# Patient Record
Sex: MALE | URBAN - METROPOLITAN AREA
[De-identification: names, ages, dates, MRNs, and addresses within clinical notes are randomized per-mention and may not be internally consistent; named-entity substitution may affect disease eponyms.]

---

## 2019-05-23 ENCOUNTER — APPOINTMENT (OUTPATIENT)
Age: 79
Setting detail: DERMATOLOGY
End: 2019-05-29

## 2019-05-23 VITALS — RESPIRATION RATE: 16 BRPM | HEIGHT: 68 IN | WEIGHT: 155 LBS

## 2019-05-23 DIAGNOSIS — L81.0 POSTINFLAMMATORY HYPERPIGMENTATION: ICD-10-CM

## 2019-05-23 DIAGNOSIS — L57.0 ACTINIC KERATOSIS: ICD-10-CM

## 2019-05-23 DIAGNOSIS — L82.1 OTHER SEBORRHEIC KERATOSIS: ICD-10-CM

## 2019-05-23 DIAGNOSIS — L82.0 INFLAMED SEBORRHEIC KERATOSIS: ICD-10-CM

## 2019-05-23 PROCEDURE — 17000 DESTRUCT PREMALG LESION: CPT | Mod: 59

## 2019-05-23 PROCEDURE — OTHER ADDITIONAL NOTES: OTHER

## 2019-05-23 PROCEDURE — 17003 DESTRUCT PREMALG LES 2-14: CPT | Mod: 59

## 2019-05-23 PROCEDURE — OTHER BENIGN DESTRUCTION: OTHER

## 2019-05-23 PROCEDURE — OTHER COUNSELING: OTHER

## 2019-05-23 PROCEDURE — OTHER LIQUID NITROGEN: OTHER

## 2019-05-23 PROCEDURE — OTHER REASSURANCE: OTHER

## 2019-05-23 PROCEDURE — 17110 DESTRUCT B9 LESION 1-14: CPT

## 2019-05-23 PROCEDURE — 99213 OFFICE O/P EST LOW 20 MIN: CPT | Mod: 25

## 2019-05-23 ASSESSMENT — LOCATION SIMPLE DESCRIPTION DERM
LOCATION SIMPLE: LEFT LOWER BACK
LOCATION SIMPLE: LEFT UPPER BACK
LOCATION SIMPLE: LEFT OCCIPITAL SCALP
LOCATION SIMPLE: RIGHT UPPER BACK
LOCATION SIMPLE: RIGHT FOREHEAD
LOCATION SIMPLE: RIGHT SCALP
LOCATION SIMPLE: POSTERIOR SCALP
LOCATION SIMPLE: LEFT FOREHEAD
LOCATION SIMPLE: RIGHT UPPER BACK
LOCATION SIMPLE: LEFT FOREHEAD
LOCATION SIMPLE: RIGHT EYEBROW

## 2019-05-23 ASSESSMENT — LOCATION DETAILED DESCRIPTION DERM
LOCATION DETAILED: LEFT SUPERIOR FOREHEAD
LOCATION DETAILED: LEFT MID-UPPER BACK
LOCATION DETAILED: LEFT MEDIAL FOREHEAD
LOCATION DETAILED: LEFT SUPERIOR MEDIAL FOREHEAD
LOCATION DETAILED: RIGHT SUPERIOR UPPER BACK
LOCATION DETAILED: RIGHT CENTRAL FRONTAL SCALP
LOCATION DETAILED: RIGHT CENTRAL EYEBROW
LOCATION DETAILED: LEFT SUPERIOR MEDIAL MIDBACK
LOCATION DETAILED: RIGHT INFERIOR MEDIAL UPPER BACK
LOCATION DETAILED: RIGHT MEDIAL FRONTAL SCALP
LOCATION DETAILED: POSTERIOR MID-PARIETAL SCALP
LOCATION DETAILED: LEFT LATERAL FOREHEAD
LOCATION DETAILED: RIGHT LATERAL FRONTAL SCALP
LOCATION DETAILED: LEFT SUPERIOR FOREHEAD
LOCATION DETAILED: LEFT LATERAL FOREHEAD
LOCATION DETAILED: RIGHT SUPERIOR UPPER BACK
LOCATION DETAILED: RIGHT SUPERIOR FOREHEAD
LOCATION DETAILED: LEFT SUPERIOR OCCIPITAL SCALP

## 2019-05-23 ASSESSMENT — LOCATION ZONE DERM
LOCATION ZONE: TRUNK
LOCATION ZONE: TRUNK
LOCATION ZONE: FACE
LOCATION ZONE: FACE
LOCATION ZONE: SCALP

## 2019-05-23 NOTE — PROCEDURE: BENIGN DESTRUCTION
Add 52 Modifier (Optional): no
Medical Necessity Information: It is in your best interest to select a reason for this procedure from the list below. All of these items fulfill various CMS LCD requirements except the new and changing color options.
Detail Level: Detailed
Medical Necessity Clause: This procedure was medically necessary because the lesions that were treated were:
Consent: The patient's consent was obtained including but not limited to risks of crusting, scabbing, blistering, scarring, darker or lighter pigmentary change, recurrence, incomplete removal and infection.
Treatment Number (Will Not Render If 0): 0
Post-Care Instructions: I reviewed with the patient in detail post-care instructions. Patient is to wear sunprotection, and avoid picking at any of the treated lesions. Pt may apply Vaseline to crusted or scabbing areas.
Anesthesia Volume In Cc: 0.5

## 2019-05-23 NOTE — PROCEDURE: ADDITIONAL NOTES
Additional Notes: If the spot on the frontal scalp is not resolved in the 2-3 months will do a biopy.
Additional Notes: Recommend in the fall to do the 5 FU cream twice daily x 30 days.
Detail Level: Simple

## 2019-09-13 ENCOUNTER — APPOINTMENT (OUTPATIENT)
Age: 79
Setting detail: DERMATOLOGY
End: 2019-09-18

## 2019-09-13 VITALS — RESPIRATION RATE: 14 BRPM | WEIGHT: 155 LBS | HEIGHT: 68 IN

## 2019-09-13 DIAGNOSIS — L82.1 OTHER SEBORRHEIC KERATOSIS: ICD-10-CM

## 2019-09-13 DIAGNOSIS — L57.0 ACTINIC KERATOSIS: ICD-10-CM

## 2019-09-13 PROCEDURE — 99213 OFFICE O/P EST LOW 20 MIN: CPT | Mod: 25

## 2019-09-13 PROCEDURE — 17003 DESTRUCT PREMALG LES 2-14: CPT

## 2019-09-13 PROCEDURE — 17000 DESTRUCT PREMALG LESION: CPT

## 2019-09-13 PROCEDURE — OTHER LIQUID NITROGEN: OTHER

## 2019-09-13 PROCEDURE — OTHER PRESCRIPTION: OTHER

## 2019-09-13 PROCEDURE — OTHER COUNSELING: OTHER

## 2019-09-13 RX ORDER — IMIQUIMOD 50 MG/G
CREAM TOPICAL DAILY
Qty: 1 | Refills: 0 | COMMUNITY
Start: 2019-09-13

## 2019-09-13 ASSESSMENT — LOCATION ZONE DERM
LOCATION ZONE: FACE
LOCATION ZONE: SCALP

## 2019-09-13 ASSESSMENT — LOCATION DETAILED DESCRIPTION DERM
LOCATION DETAILED: RIGHT INFERIOR CENTRAL MALAR CHEEK
LOCATION DETAILED: LEFT LATERAL MANDIBULAR CHEEK
LOCATION DETAILED: LEFT LATERAL BUCCAL CHEEK
LOCATION DETAILED: MID-FRONTAL SCALP
LOCATION DETAILED: LEFT CENTRAL MALAR CHEEK
LOCATION DETAILED: LEFT MEDIAL FRONTAL SCALP

## 2019-09-13 ASSESSMENT — LOCATION SIMPLE DESCRIPTION DERM
LOCATION SIMPLE: LEFT SCALP
LOCATION SIMPLE: RIGHT CHEEK
LOCATION SIMPLE: LEFT CHEEK
LOCATION SIMPLE: ANTERIOR SCALP

## 2019-09-13 NOTE — PROCEDURE: LIQUID NITROGEN
Render Note In Bullet Format When Appropriate: No
Consent: - Discussed that these are a result of cumulative sun exposure.\\n- Verbal and written consent was obtained, and risks were reviewed prior to procedure today. \\n- Risks discussed include but are not limited to pain, crusting, scabbing, blistering, scarring, temporary or permanent darker or lighter pigmentary change, recurrence, incomplete resolution, and infection.
Detail Level: Detailed
Render Post-Care Instructions In Note?: yes
Post-Care Instructions: Patient was instructed to avoid picking at any of the treated lesions. Should any lesions treated today not be resolved within 4 weeks or if not certain, then return to clinic for re-evaluation.
Duration Of Freeze Thaw-Cycle (Seconds): 0

## 2019-10-30 ENCOUNTER — APPOINTMENT (OUTPATIENT)
Age: 79
Setting detail: DERMATOLOGY
End: 2019-10-31

## 2019-10-30 VITALS — RESPIRATION RATE: 16 BRPM | HEIGHT: 68 IN | WEIGHT: 155 LBS

## 2019-10-30 DIAGNOSIS — L57.0 ACTINIC KERATOSIS: ICD-10-CM

## 2019-10-30 DIAGNOSIS — L82.1 OTHER SEBORRHEIC KERATOSIS: ICD-10-CM

## 2019-10-30 PROBLEM — D48.5 NEOPLASM OF UNCERTAIN BEHAVIOR OF SKIN: Status: ACTIVE | Noted: 2019-10-30

## 2019-10-30 PROCEDURE — 17000 DESTRUCT PREMALG LESION: CPT | Mod: 59

## 2019-10-30 PROCEDURE — OTHER LIQUID NITROGEN: OTHER

## 2019-10-30 PROCEDURE — OTHER COUNSELING: OTHER

## 2019-10-30 PROCEDURE — 11102 TANGNTL BX SKIN SINGLE LES: CPT

## 2019-10-30 PROCEDURE — 17003 DESTRUCT PREMALG LES 2-14: CPT

## 2019-10-30 PROCEDURE — 88305 TISSUE EXAM BY PATHOLOGIST: CPT

## 2019-10-30 PROCEDURE — 99213 OFFICE O/P EST LOW 20 MIN: CPT | Mod: 25

## 2019-10-30 PROCEDURE — OTHER PATHOLOGY BILLING: OTHER

## 2019-10-30 PROCEDURE — OTHER BIOPSY BY SHAVE METHOD: OTHER

## 2019-10-30 ASSESSMENT — LOCATION SIMPLE DESCRIPTION DERM
LOCATION SIMPLE: RIGHT CHEEK
LOCATION SIMPLE: RIGHT OCCIPITAL SCALP
LOCATION SIMPLE: LEFT CHEEK
LOCATION SIMPLE: LEFT OCCIPITAL SCALP
LOCATION SIMPLE: RIGHT SCALP
LOCATION SIMPLE: RIGHT FOREHEAD
LOCATION SIMPLE: LEFT SUPERIOR EYELID

## 2019-10-30 ASSESSMENT — LOCATION DETAILED DESCRIPTION DERM
LOCATION DETAILED: LEFT SUPERIOR OCCIPITAL SCALP
LOCATION DETAILED: RIGHT INFERIOR CENTRAL MALAR CHEEK
LOCATION DETAILED: LEFT LATERAL MANDIBULAR CHEEK
LOCATION DETAILED: LEFT SUPERIOR EYELID
LOCATION DETAILED: RIGHT CENTRAL FRONTAL SCALP
LOCATION DETAILED: RIGHT SUPERIOR FOREHEAD
LOCATION DETAILED: RIGHT SUPERIOR OCCIPITAL SCALP

## 2019-10-30 ASSESSMENT — LOCATION ZONE DERM
LOCATION ZONE: EYELID
LOCATION ZONE: SCALP
LOCATION ZONE: FACE

## 2019-10-30 NOTE — PROCEDURE: LIQUID NITROGEN
Consent: Discussed that these are a result of cumulative sun exposure. Verbal and written consent was obtained, and risks were reviewed prior to procedure today. Risks discussed include but are not limited to pain, crusting, scabbing, blistering, scarring, temporary or permanent darker or lighter pigmentary change, recurrence, incomplete resolution, and infection.
Post-Care Instructions: Patient was instructed to avoid picking at any of the treated lesions. Should any lesions treated today not be resolved within 8 weeks or if not certain, then return to clinic for re-evaluation.
Render Post-Care Instructions In Note?: yes
Detail Level: Detailed
Render Note In Bullet Format When Appropriate: No
Duration Of Freeze Thaw-Cycle (Seconds): 0

## 2019-10-30 NOTE — PROCEDURE: COUNSELING
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
Detail Level: Detailed
Detail Level: Zone

## 2019-10-30 NOTE — PROCEDURE: BIOPSY BY SHAVE METHOD
Destruction After The Procedure: No
Depth Of Biopsy: dermis
Detail Level: Detailed
Type Of Destruction Used: Curettage
Billing Type: Third-Party Bill
Consent: - Verbal and written consent was obtained and risks were reviewed prior to procedure today. \\n- Risks discussed include but are not limited to scarring, infection, bleeding, scabbing, incomplete removal, nerve damage, pain, and allergy to anesthesia.
Wound Care: Petrolatum
X Size Of Lesion In Cm: 0
Was A Bandage Applied: Yes
Dressing: bandage
Biopsy Method: Dermablade
Anesthesia Type: 2% lidocaine with epinephrine
Cryotherapy Text: The wound bed was treated with cryotherapy after the biopsy was performed.
Notification Instructions: - It can take up to 2 weeks to get a biopsy result. \\n- Please refrain from calling to request results until after 2 weeks.
Silver Nitrate Text: The wound bed was treated with silver nitrate after the biopsy was performed.
Anesthesia Volume In Cc (Will Not Render If 0): 0.3
Electrodesiccation Text: The wound bed was treated with electrodesiccation after the biopsy was performed.
Curettage Text: The wound bed was treated with curettage after the biopsy was performed.
Biopsy Type: H and E
Electrodesiccation And Curettage Text: The wound bed was treated with electrodesiccation and curettage after the biopsy was performed.
Hemostasis: Drysol
Post-Care Instructions: WOUND CARE:\\nDo NOT submerge wound in a bath, swimming pool, or hot tub for at least 1 week or for as long as there is an open wound. Gently cleanse the site daily with mild soap and water. Be careful NOT to allow a forceful stream of water to hit the biopsy site. After cleaning/showering, apply a thin layer of petrolatum ointment or Aquaphor in the wound followed by an adhesive bandage. Continue this process daily until healed. \\n\\nBLEEDING:\\nIf you develop persistent bleeding, apply firm and steady pressure over the dressing with gauze for 15 minutes. If bleeding persists, reapply pressure with an ice pack over the gauze for 15 minutes. NEVER APPLY ICE DIRECTLY TO THE SKIN. Do NOT peek under the gauze during these 15 minutes of pressure.  Call our office at [phone] if you cannot get the bleeding to stop. \\n\\nINFECTION:\\nSigns of infection may include increasing rather than decreasing pain (after the first few days), increasing redness/swelling/heat, draining pus, pink/red streaks around the wound, and fever or chills.  Please call our office immediately at [phone] if infection is suspected. It is normal to have some mild redness on or around the wound edges; this will lighten day by day and will become less tender.\\n\\nPAIN:\\nPain is usually minimal, but if needed you may take acetaminophen (Tylenol) every four hours as needed. Applying an ice pack over the dressing for 15-20 minutes every 2-3 hours will relieve swelling, lessen pain, and help minimize bruising. NEVER APPLY ICE DIRECTLY TO THE SKIN. Avoid ibuprofen (Advil, Motrin) and naproxen (Aleve) for the first 48 hours as these can increase bleeding.\\n\\nSWELLIG AND BRUISING:\\nSwelling and bruising are common and temporary, usually lasting 1 - 2 weeks. It is more common in areas treated around the eyes, hands, and feet. In the days following the procedure, swelling and bruising can be minimized by keeping the affected areas elevated when possible, reducing salty foods, and applying ice packs over the dressing for 15-20 minutes every 2-3 hours. NEVER APPLY ICE DIRECTLY TO THE SKIN.\\n\\nITCHING:\\nItchiness on and around the wound is very common and can last several days to weeks after surgery. Mild itch is normal as the wound is healing. \\n\\nNERVE CHANGES:\\nNumbness is usually temporary, but it may last for several weeks to months. You may also experience sharp pains at the wound sight as it heals.  Mild pain is normal and will gradually improve with time.\\n \\nNO SMOKING:\\nSmoking will delay the healing process. If you smoke, we recommend trying to quit or at minimum reduce how much you smoke following a procedure.

## 2019-10-30 NOTE — PROCEDURE: PATHOLOGY BILLING
Immunohistochemistry ([id number] and [id number]) billing is not performed here. Please use the Immunohistochemistry Stain Billing plan to accomplish this. Immunohistochemistry ([id number] and [id number]) billing is not performed here. Please use the Immunohistochemistry Stain Billing plan to accomplish this.

## 2020-02-05 ENCOUNTER — APPOINTMENT (OUTPATIENT)
Age: 80
Setting detail: DERMATOLOGY
End: 2020-02-12

## 2020-02-05 VITALS — RESPIRATION RATE: 15 BRPM | HEIGHT: 60 IN | WEIGHT: 155 LBS

## 2020-02-05 DIAGNOSIS — L57.0 ACTINIC KERATOSIS: ICD-10-CM

## 2020-02-05 PROCEDURE — OTHER COUNSELING: OTHER

## 2020-02-05 PROCEDURE — 17000 DESTRUCT PREMALG LESION: CPT

## 2020-02-05 PROCEDURE — OTHER ADDITIONAL NOTES: OTHER

## 2020-02-05 PROCEDURE — OTHER LIQUID NITROGEN: OTHER

## 2020-02-05 PROCEDURE — 99213 OFFICE O/P EST LOW 20 MIN: CPT | Mod: 25

## 2020-02-05 PROCEDURE — 17003 DESTRUCT PREMALG LES 2-14: CPT

## 2020-02-05 ASSESSMENT — LOCATION DETAILED DESCRIPTION DERM
LOCATION DETAILED: LEFT CENTRAL ZYGOMA
LOCATION DETAILED: RIGHT MEDIAL MALAR CHEEK
LOCATION DETAILED: LEFT SUPERIOR CENTRAL MALAR CHEEK
LOCATION DETAILED: RIGHT POSTERIOR EAR
LOCATION DETAILED: LEFT INFERIOR CENTRAL MALAR CHEEK
LOCATION DETAILED: RIGHT INFERIOR MEDIAL BUCCAL CHEEK
LOCATION DETAILED: POSTERIOR MID-PARIETAL SCALP
LOCATION DETAILED: RIGHT RADIAL DORSAL HAND
LOCATION DETAILED: RIGHT SUPERIOR PARIETAL SCALP
LOCATION DETAILED: LEFT SUPERIOR PARIETAL SCALP
LOCATION DETAILED: LEFT INFERIOR POSTAURICULAR SKIN

## 2020-02-05 ASSESSMENT — LOCATION ZONE DERM
LOCATION ZONE: HAND
LOCATION ZONE: EAR
LOCATION ZONE: FACE
LOCATION ZONE: SCALP

## 2020-02-05 ASSESSMENT — LOCATION SIMPLE DESCRIPTION DERM
LOCATION SIMPLE: RIGHT CHEEK
LOCATION SIMPLE: LEFT ZYGOMA
LOCATION SIMPLE: POSTERIOR SCALP
LOCATION SIMPLE: SCALP
LOCATION SIMPLE: LEFT CHEEK
LOCATION SIMPLE: RIGHT EAR
LOCATION SIMPLE: RIGHT HAND

## 2020-04-02 ENCOUNTER — APPOINTMENT (OUTPATIENT)
Age: 80
Setting detail: DERMATOLOGY
End: 2020-04-02

## 2020-04-02 VITALS — HEIGHT: 68 IN | RESPIRATION RATE: 16 BRPM | WEIGHT: 155 LBS

## 2020-04-02 DIAGNOSIS — D485 NEOPLASM OF UNCERTAIN BEHAVIOR OF SKIN: ICD-10-CM

## 2020-04-02 DIAGNOSIS — L57.0 ACTINIC KERATOSIS: ICD-10-CM

## 2020-04-02 PROBLEM — D48.5 NEOPLASM OF UNCERTAIN BEHAVIOR OF SKIN: Status: ACTIVE | Noted: 2020-04-02

## 2020-04-02 PROCEDURE — OTHER PRESCRIPTION: OTHER

## 2020-04-02 PROCEDURE — OTHER COUNSELING: OTHER

## 2020-04-02 PROCEDURE — 88305 TISSUE EXAM BY PATHOLOGIST: CPT

## 2020-04-02 PROCEDURE — 99213 OFFICE O/P EST LOW 20 MIN: CPT | Mod: 25

## 2020-04-02 PROCEDURE — OTHER PATHOLOGY BILLING: OTHER

## 2020-04-02 PROCEDURE — 11102 TANGNTL BX SKIN SINGLE LES: CPT

## 2020-04-02 PROCEDURE — OTHER BIOPSY BY SHAVE METHOD: OTHER

## 2020-04-02 RX ORDER — IMIQUIMOD 12.5 MG/.25G
5% CREAM TOPICAL TIW
Qty: 12 | Refills: 0 | Status: ERX | COMMUNITY
Start: 2020-04-02

## 2020-04-02 ASSESSMENT — LOCATION ZONE DERM
LOCATION ZONE: SCALP
LOCATION ZONE: FACE

## 2020-04-02 ASSESSMENT — LOCATION SIMPLE DESCRIPTION DERM
LOCATION SIMPLE: LEFT CHEEK
LOCATION SIMPLE: LEFT SCALP

## 2020-04-02 ASSESSMENT — LOCATION DETAILED DESCRIPTION DERM
LOCATION DETAILED: LEFT LATERAL MANDIBULAR CHEEK
LOCATION DETAILED: LEFT MEDIAL FRONTAL SCALP

## 2020-04-02 NOTE — PROCEDURE: PATHOLOGY BILLING
Immunohistochemistry ([id number] and [id number]) billing is not performed here. Please use the Immunohistochemistry Stain Billing plan to accomplish this. Immunohistochemistry ([id number] and [id number]) billing is not performed here. Please use the Immunohistochemistry Stain Billing plan to accomplish this.

## 2020-04-02 NOTE — PROCEDURE: BIOPSY BY SHAVE METHOD
Render Post-Care Instructions In Note?: no
Notification Instructions: Patient will be notified of biopsy results. However, patient instructed to call the office if not contacted within 2 weeks.
Anesthesia Volume In Cc (Will Not Render If 0): 0.5
Additional Anesthesia Volume In Cc (Will Not Render If 0): 0
Depth Of Biopsy: dermis
Hemostasis: Drysol
Billing Type: Third-Party Bill
Electrodesiccation Text: The wound bed was treated with electrodesiccation after the biopsy was performed.
Detail Level: Detailed
Dressing: bandage
Curettage Text: The wound bed was treated with curettage after the biopsy was performed.
Validate Note Data (See Information Below): Yes
Electrodesiccation And Curettage Text: The wound bed was treated with electrodesiccation and curettage after the biopsy was performed.
Biopsy Type: H and E
Cryotherapy Text: The wound bed was treated with cryotherapy after the biopsy was performed.
Information: Selecting Yes will display possible errors in your note based on the variables you have selected. This validation is only offered as a suggestion for you. PLEASE NOTE THAT THE VALIDATION TEXT WILL BE REMOVED WHEN YOU FINALIZE YOUR NOTE. IF YOU WANT TO FAX A PRELIMINARY NOTE YOU WILL NEED TO TOGGLE THIS TO 'NO' IF YOU DO NOT WANT IT IN YOUR FAXED NOTE.
Silver Nitrate Text: The wound bed was treated with silver nitrate after the biopsy was performed.
Consent: Written consent was obtained and risks were reviewed including but not limited to scarring, infection, bleeding, scabbing, incomplete removal, nerve damage and allergy to anesthesia.
Biopsy Method: Dermablade
Anesthesia Type: 1% lidocaine with epinephrine
Post-Care Instructions: I reviewed with the patient in detail post-care instructions. Patient is to keep the biopsy site dry overnight, and then apply bacitracin twice daily until healed. Patient may apply hydrogen peroxide soaks to remove any crusting.
Wound Care: Petrolatum
Type Of Destruction Used: Curettage

## 2020-05-14 ENCOUNTER — APPOINTMENT (OUTPATIENT)
Age: 80
Setting detail: DERMATOLOGY
End: 2020-05-20

## 2020-05-14 VITALS — WEIGHT: 155 LBS | RESPIRATION RATE: 16 BRPM | HEIGHT: 68 IN

## 2020-05-14 DIAGNOSIS — D485 NEOPLASM OF UNCERTAIN BEHAVIOR OF SKIN: ICD-10-CM

## 2020-05-14 PROBLEM — D48.5 NEOPLASM OF UNCERTAIN BEHAVIOR OF SKIN: Status: ACTIVE | Noted: 2020-05-14

## 2020-05-14 PROCEDURE — OTHER PATHOLOGY BILLING: OTHER

## 2020-05-14 PROCEDURE — 11102 TANGNTL BX SKIN SINGLE LES: CPT

## 2020-05-14 PROCEDURE — 88305 TISSUE EXAM BY PATHOLOGIST: CPT

## 2020-05-14 PROCEDURE — OTHER BIOPSY BY SHAVE METHOD: OTHER

## 2020-05-14 ASSESSMENT — LOCATION DETAILED DESCRIPTION DERM: LOCATION DETAILED: LEFT SUPERIOR LATERAL NECK

## 2020-05-14 ASSESSMENT — LOCATION ZONE DERM: LOCATION ZONE: NECK

## 2020-05-14 ASSESSMENT — LOCATION SIMPLE DESCRIPTION DERM: LOCATION SIMPLE: NECK

## 2020-05-14 NOTE — PROCEDURE: BIOPSY BY SHAVE METHOD
Path Notes Override (Will Replace All Of The Above Text): [id number] Path Notes Override (Will Replace All Of The Above Text): [id number]

## 2020-05-14 NOTE — PROCEDURE: PATHOLOGY BILLING
Immunohistochemistry ([id number] and [id number]) billing is not performed here. Please use the Immunohistochemistry Stain Billing plan to accomplish this. Immunohistochemistry ([id number] and [id number]) billing is not performed here. Please use the Immunohistochemistry Stain Billing plan to accomplish this.

## 2020-05-27 ENCOUNTER — RX ONLY (RX ONLY)
Age: 80
End: 2020-05-27

## 2020-05-27 RX ORDER — IMIQUIMOD 12.5 MG/.25G
CREAM TOPICAL
Qty: 24 | Refills: 1 | Status: ERX | COMMUNITY
Start: 2020-05-27

## 2020-06-08 ENCOUNTER — APPOINTMENT (OUTPATIENT)
Age: 80
Setting detail: DERMATOLOGY
End: 2020-06-08

## 2020-06-08 DIAGNOSIS — L57.8 OTHER SKIN CHANGES DUE TO CHRONIC EXPOSURE TO NONIONIZING RADIATION: ICD-10-CM

## 2020-06-08 PROBLEM — D04.4 CARCINOMA IN SITU OF SKIN OF SCALP AND NECK: Status: ACTIVE | Noted: 2020-06-08

## 2020-06-08 PROCEDURE — 99213 OFFICE O/P EST LOW 20 MIN: CPT | Mod: 25

## 2020-06-08 PROCEDURE — 13132 CMPLX RPR F/C/C/M/N/AX/G/H/F: CPT

## 2020-06-08 PROCEDURE — 17311 MOHS 1 STAGE H/N/HF/G: CPT

## 2020-06-08 PROCEDURE — OTHER MOHS BY LAYER: OTHER

## 2020-06-08 PROCEDURE — OTHER COUNSELING: OTHER

## 2020-06-08 ASSESSMENT — LOCATION SIMPLE DESCRIPTION DERM: LOCATION SIMPLE: POSTERIOR NECK

## 2020-06-08 ASSESSMENT — LOCATION DETAILED DESCRIPTION DERM: LOCATION DETAILED: RIGHT MEDIAL TRAPEZIAL NECK

## 2020-06-08 ASSESSMENT — LOCATION ZONE DERM: LOCATION ZONE: NECK

## 2020-06-08 NOTE — PROCEDURE: MOHS BY LAYER
Epidermal Sutures: 6-0 Ethilon
Distance Of Undermining In Cm (Required): 1
Suture Removal: 7 days
Nostril Rim Text: The closure involved the nostril rim.
Complex Requirements: Exposure Of Vital Structure?: No
Retention Suture Text: Retention sutures were placed to support the closure and prevent dehiscence.
Stage 1: Anesthesia Volume In Cc: 2.5
Stage 6: Number Of Sections (Blocks) ?: 0
Stage 1: Number Of Sections (Blocks) ?: 2
Simple / Intermediate / Complex Repair - Final Wound Length In Cm: 2.7
Post-Care Instructions: I reviewed with the patient in detail post-care instructions. Patient is not to engage in any heavy lifting, exercise, or swimming for the next 14 days. Should the patient develop any fevers, chills, bleeding, severe pain patient will contact the office immediately.
Stage 1: Depth Of Layer: adipose tissue
Repair Type: Complex Repair
Estimated Blood Loss (Cc): minimal
Complex Requirements: Extensive Undermining Performed?: Yes
Consent: The rationale for Mohs was explained to the patient and consent was obtained. The risks, benefits and alternatives to therapy were discussed in detail. Specifically, the risks of infection, scarring, bleeding, prolonged wound healing, incomplete removal, allergy to anesthesia, nerve injury and recurrence were addressed. Prior to the procedure, the treatment site was clearly identified and confirmed by the patient. All components of Universal Protocol/PAUSE Rule completed.
Debridement Text: The wound edges were debrided prior to proceeding with the closure to facilitate wound healing.
Helical Rim Text: The closure involved the helical rim.
Hemostasis: Electrocautery
Stage 1: Defect Y-Dimension: 1.5
Deep Sutures: 5-0 Vicryl
Epidermal Closure: simple interrupted
Body Location Override (Optional - Billing Will Still Be Based On Selected Body Map Location If Applicable): Left Lateral Superior Neck
Undermining Type: Entire Wound
Anesthesia Type: 1% lidocaine with epinephrine
Wound Care: Petrolatum
Vermilion Border Text: The closure involved the vermilion border.
Detail Level: Detailed

## 2020-06-18 ENCOUNTER — APPOINTMENT (OUTPATIENT)
Age: 80
Setting detail: DERMATOLOGY
End: 2020-06-23

## 2020-06-18 DIAGNOSIS — L57.0 ACTINIC KERATOSIS: ICD-10-CM

## 2020-06-18 DIAGNOSIS — Z48.02 ENCOUNTER FOR REMOVAL OF SUTURES: ICD-10-CM

## 2020-06-18 PROBLEM — D48.5 NEOPLASM OF UNCERTAIN BEHAVIOR OF SKIN: Status: ACTIVE | Noted: 2020-06-18

## 2020-06-18 PROCEDURE — 88305 TISSUE EXAM BY PATHOLOGIST: CPT

## 2020-06-18 PROCEDURE — OTHER SUTURE REMOVAL (GLOBAL PERIOD): OTHER

## 2020-06-18 PROCEDURE — 11102 TANGNTL BX SKIN SINGLE LES: CPT | Mod: 79

## 2020-06-18 PROCEDURE — 99212 OFFICE O/P EST SF 10 MIN: CPT | Mod: 24,25

## 2020-06-18 PROCEDURE — OTHER PATHOLOGY BILLING: OTHER

## 2020-06-18 PROCEDURE — OTHER ADDITIONAL NOTES: OTHER

## 2020-06-18 PROCEDURE — OTHER BIOPSY BY SHAVE METHOD: OTHER

## 2020-06-18 PROCEDURE — OTHER COUNSELING: OTHER

## 2020-06-18 ASSESSMENT — LOCATION SIMPLE DESCRIPTION DERM
LOCATION SIMPLE: LEFT CHEEK
LOCATION SIMPLE: RIGHT SCALP

## 2020-06-18 ASSESSMENT — LOCATION ZONE DERM
LOCATION ZONE: FACE
LOCATION ZONE: SCALP

## 2020-06-18 ASSESSMENT — LOCATION DETAILED DESCRIPTION DERM
LOCATION DETAILED: LEFT LATERAL MANDIBULAR CHEEK
LOCATION DETAILED: RIGHT MEDIAL FRONTAL SCALP

## 2020-06-18 NOTE — PROCEDURE: BIOPSY BY SHAVE METHOD
Wound Care: Petrolatum
Anesthesia Volume In Cc (Will Not Render If 0): 0.5
Post-Care Instructions: WOUND CARE:\\nDo NOT submerge wound in a bath, swimming pool, or hot tub for at least 1 week or for as long as there is an open wound. Gently cleanse the site daily with mild soap and water. Be careful NOT to allow a forceful stream of water to hit the biopsy site. After cleaning/showering, apply a thin layer of petrolatum ointment or Aquaphor in the wound followed by an adhesive bandage. Continue this process daily until healed. \\n\\nBLEEDING:\\nIf you develop persistent bleeding, apply firm and steady pressure over the dressing with gauze for 15 minutes. If bleeding persists, reapply pressure with an ice pack over the gauze for 15 minutes. NEVER APPLY ICE DIRECTLY TO THE SKIN. Do NOT peek under the gauze during these 15 minutes of pressure.  Call our office at [phone] if you cannot get the bleeding to stop. \\n\\nINFECTION:\\nSigns of infection may include increasing rather than decreasing pain (after the first few days), increasing redness/swelling/heat, draining pus, pink/red streaks around the wound, and fever or chills.  Please call our office immediately at [phone] if infection is suspected. It is normal to have some mild redness on or around the wound edges; this will lighten day by day and will become less tender.\\n\\nPAIN:\\nPain is usually minimal, but if needed you may take acetaminophen (Tylenol) every four hours as needed. Applying an ice pack over the dressing for 15-20 minutes every 2-3 hours will relieve swelling, lessen pain, and help minimize bruising. NEVER APPLY ICE DIRECTLY TO THE SKIN. Avoid ibuprofen (Advil, Motrin) and naproxen (Aleve) for the first 48 hours as these can increase bleeding.\\n\\nSWELLIG AND BRUISING:\\nSwelling and bruising are common and temporary, usually lasting 1 - 2 weeks. It is more common in areas treated around the eyes, hands, and feet. In the days following the procedure, swelling and bruising can be minimized by keeping the affected areas elevated when possible, reducing salty foods, and applying ice packs over the dressing for 15-20 minutes every 2-3 hours. NEVER APPLY ICE DIRECTLY TO THE SKIN.\\n\\nITCHING:\\nItchiness on and around the wound is very common and can last several days to weeks after surgery. Mild itch is normal as the wound is healing. \\n\\nNERVE CHANGES:\\nNumbness is usually temporary, but it may last for several weeks to months. You may also experience sharp pains at the wound sight as it heals.  Mild pain is normal and will gradually improve with time.\\n \\nNO SMOKING:\\nSmoking will delay the healing process. If you smoke, we recommend trying to quit or at minimum reduce how much you smoke following a procedure.
Biopsy Type: H and E
X Size Of Lesion In Cm: 0
Type Of Destruction Used: Electrodesiccation
Depth Of Biopsy: dermis
Electrodesiccation Text: The wound bed was treated with electrodesiccation after the biopsy was performed.
Electrodesiccation And Curettage Text: The wound bed was treated with electrodesiccation and curettage after the biopsy was performed.
Notification Instructions: - It can take up to 2 weeks to get a biopsy result. \\n- Please refrain from calling to request results until after 2 weeks.
Hemostasis: Aluminum Chloride
Silver Nitrate Text: The wound bed was treated with silver nitrate after the biopsy was performed.
Dressing: bandage
Biopsy Method: Dermablade
Bill For Surgical Tray: no
Detail Level: Detailed
Curettage Text: The wound bed was treated with curettage after the biopsy was performed.
Billing Type: Third-Party Bill
Anesthesia Type: 2% lidocaine with epinephrine
Consent: - Verbal and written consent was obtained and risks were reviewed prior to procedure today. \\n- Risks discussed include but are not limited to scarring, infection, bleeding, scabbing, incomplete removal, nerve damage, pain, and allergy to anesthesia.
Render In Bullet Format When Appropriate: Yes
Cryotherapy Text: The wound bed was treated with cryotherapy after the biopsy was performed.

## 2020-06-18 NOTE — PROCEDURE: PATHOLOGY BILLING
Immunohistochemistry ([id number] and [id number]) billing is not performed here. Please use the Immunohistochemistry Stain Billing plan to accomplish this. Immunohistochemistry ([id number] and [id number]) billing is not performed here. Please use the Immunohistochemistry Stain Billing plan to accomplish this.

## 2020-06-18 NOTE — PROCEDURE: ADDITIONAL NOTES
Additional Notes: Patient has finished course of imiquimod and c/o dryness and itching on scalp. Nurse applied a thick layer of Aquaphor to scalp and KL recommended to RTC in 1 month to recheck scalp.
Detail Level: Simple

## 2020-06-18 NOTE — PROCEDURE: SUTURE REMOVAL (GLOBAL PERIOD)
Add 37907 Cpt? (Important Note: In 2017 The Use Of 50107 Is Being Tracked By Cms To Determine Future Global Period Reimbursement For Global Periods): no
Detail Level: Detailed

## 2020-06-18 NOTE — PROCEDURE: PATHOLOGY BILLING
vaginal restrictions only for two weeks Rendering Text In Billing: The biopsy specimen was grossed and processed into a slide.

## 2020-06-18 NOTE — PROCEDURE: COUNSELING
Detail Level: Zone
Patient Specific Counseling (Will Not Stick From Patient To Patient): - Advised that this lesion has features that suggest a skin cancer so a biopsy is necessary to confirm the diagnosis and guide treatment.\\n- Patient expressed understanding. \\n- If skin cancer is confirmed, further treatment will be necessary.
Detail Level: Detailed

## 2020-07-01 ENCOUNTER — APPOINTMENT (OUTPATIENT)
Age: 80
Setting detail: DERMATOLOGY
End: 2020-07-02

## 2020-07-01 VITALS — RESPIRATION RATE: 16 BRPM | HEIGHT: 60 IN | WEIGHT: 150 LBS

## 2020-07-01 DIAGNOSIS — L57.0 ACTINIC KERATOSIS: ICD-10-CM

## 2020-07-01 PROCEDURE — 17003 DESTRUCT PREMALG LES 2-14: CPT

## 2020-07-01 PROCEDURE — OTHER COUNSELING: OTHER

## 2020-07-01 PROCEDURE — 17000 DESTRUCT PREMALG LESION: CPT

## 2020-07-01 PROCEDURE — OTHER LIQUID NITROGEN: OTHER

## 2020-07-01 ASSESSMENT — LOCATION DETAILED DESCRIPTION DERM
LOCATION DETAILED: LEFT NASAL DORSUM
LOCATION DETAILED: NASAL DORSUM
LOCATION DETAILED: NASAL SUPRATIP
LOCATION DETAILED: LEFT SUPERIOR PARIETAL SCALP

## 2020-07-01 ASSESSMENT — LOCATION SIMPLE DESCRIPTION DERM
LOCATION SIMPLE: SCALP
LOCATION SIMPLE: NOSE

## 2020-07-01 ASSESSMENT — LOCATION ZONE DERM
LOCATION ZONE: NOSE
LOCATION ZONE: SCALP

## 2020-07-01 NOTE — PROCEDURE: COUNSELING
Detail Level: Detailed
Patient Specific Counseling (Will Not Stick From Patient To Patient): Consider field therapy on nose this fall

## 2020-07-22 ENCOUNTER — APPOINTMENT (OUTPATIENT)
Age: 80
Setting detail: DERMATOLOGY
End: 2020-07-24

## 2020-07-22 VITALS — HEIGHT: 69 IN | WEIGHT: 150 LBS | RESPIRATION RATE: 16 BRPM

## 2020-07-22 DIAGNOSIS — D485 NEOPLASM OF UNCERTAIN BEHAVIOR OF SKIN: ICD-10-CM

## 2020-07-22 DIAGNOSIS — L57.0 ACTINIC KERATOSIS: ICD-10-CM

## 2020-07-22 PROBLEM — D48.5 NEOPLASM OF UNCERTAIN BEHAVIOR OF SKIN: Status: ACTIVE | Noted: 2020-07-22

## 2020-07-22 PROCEDURE — 11102 TANGNTL BX SKIN SINGLE LES: CPT

## 2020-07-22 PROCEDURE — 11103 TANGNTL BX SKIN EA SEP/ADDL: CPT

## 2020-07-22 PROCEDURE — OTHER BIOPSY BY SHAVE METHOD: OTHER

## 2020-07-22 PROCEDURE — OTHER LIQUID NITROGEN: OTHER

## 2020-07-22 PROCEDURE — 17000 DESTRUCT PREMALG LESION: CPT | Mod: 59

## 2020-07-22 PROCEDURE — 17003 DESTRUCT PREMALG LES 2-14: CPT

## 2020-07-22 PROCEDURE — OTHER ADDITIONAL NOTES: OTHER

## 2020-07-22 ASSESSMENT — LOCATION SIMPLE DESCRIPTION DERM
LOCATION SIMPLE: LEFT SCALP
LOCATION SIMPLE: RIGHT SCALP
LOCATION SIMPLE: LEFT FOREHEAD
LOCATION SIMPLE: RIGHT FOREHEAD

## 2020-07-22 ASSESSMENT — LOCATION DETAILED DESCRIPTION DERM
LOCATION DETAILED: RIGHT MEDIAL FRONTAL SCALP
LOCATION DETAILED: LEFT MEDIAL FRONTAL SCALP
LOCATION DETAILED: LEFT CENTRAL FRONTAL SCALP
LOCATION DETAILED: RIGHT SUPERIOR MEDIAL FOREHEAD
LOCATION DETAILED: LEFT SUPERIOR MEDIAL FOREHEAD

## 2020-07-22 ASSESSMENT — LOCATION ZONE DERM
LOCATION ZONE: FACE
LOCATION ZONE: SCALP

## 2020-07-22 NOTE — PROCEDURE: ADDITIONAL NOTES
Detail Level: Simple
Additional Notes: -these spots have been treated with liquid nitrogen multiple times as well as Aldara and still persist.\\n-The base was treated with LN2 after SBx.

## 2020-07-22 NOTE — PROCEDURE: BIOPSY BY SHAVE METHOD
X Size Of Lesion In Cm: 0
Anesthesia Type: 1% lidocaine with epinephrine
Validate That Anesthesia Is Not 0: No
Electrodesiccation And Curettage Text: The wound bed was treated with electrodesiccation and curettage after the biopsy was performed.
Biopsy Method: Dermablade
Electrodesiccation Text: The wound bed was treated with electrodesiccation after the biopsy was performed.
Consent: Written consent was obtained and risks were reviewed including but not limited to scarring, infection, bleeding, scabbing, incomplete removal, nerve damage and allergy to anesthesia.
Post-Care Instructions: I reviewed with the patient in detail post-care instructions. Patient is to keep the biopsy site dry overnight, and then apply bacitracin twice daily until healed. Patient may apply hydrogen peroxide soaks to remove any crusting.
Type Of Destruction Used: Curettage
Wound Care: Petrolatum
Was A Bandage Applied: Yes
Silver Nitrate Text: The wound bed was treated with silver nitrate after the biopsy was performed.
Hemostasis: Drysol
Depth Of Biopsy: dermis
Notification Instructions: Patient will be notified of biopsy results. However, patient instructed to call the office if not contacted within 2 weeks.
Dressing: bandage
Billing Type: Third-Party Bill
Cryotherapy Text: The wound bed was treated with cryotherapy after the biopsy was performed.
Curettage Text: The wound bed was treated with curettage after the biopsy was performed.
Information: Selecting Yes will display possible errors in your note based on the variables you have selected. This validation is only offered as a suggestion for you. PLEASE NOTE THAT THE VALIDATION TEXT WILL BE REMOVED WHEN YOU FINALIZE YOUR NOTE. IF YOU WANT TO FAX A PRELIMINARY NOTE YOU WILL NEED TO TOGGLE THIS TO 'NO' IF YOU DO NOT WANT IT IN YOUR FAXED NOTE.
Anesthesia Volume In Cc (Will Not Render If 0): 0.5
Detail Level: Detailed
Biopsy Type: H and E

## 2020-11-25 ENCOUNTER — APPOINTMENT (OUTPATIENT)
Dept: URBAN - METROPOLITAN AREA CLINIC 254 | Age: 80
Setting detail: DERMATOLOGY
End: 2020-11-30

## 2020-11-25 VITALS — WEIGHT: 145 LBS | HEIGHT: 68 IN | RESPIRATION RATE: 16 BRPM

## 2020-11-25 DIAGNOSIS — D485 NEOPLASM OF UNCERTAIN BEHAVIOR OF SKIN: ICD-10-CM

## 2020-11-25 DIAGNOSIS — L57.0 ACTINIC KERATOSIS: ICD-10-CM

## 2020-11-25 PROBLEM — D48.5 NEOPLASM OF UNCERTAIN BEHAVIOR OF SKIN: Status: ACTIVE | Noted: 2020-11-25

## 2020-11-25 PROCEDURE — OTHER BIOPSY BY SHAVE METHOD: OTHER

## 2020-11-25 PROCEDURE — 11102 TANGNTL BX SKIN SINGLE LES: CPT

## 2020-11-25 PROCEDURE — OTHER PRESCRIPTION: OTHER

## 2020-11-25 PROCEDURE — OTHER COUNSELING: OTHER

## 2020-11-25 PROCEDURE — 99213 OFFICE O/P EST LOW 20 MIN: CPT | Mod: 25

## 2020-11-25 RX ORDER — UREA 40 %
40% CREAM (GRAM) TOPICAL
Qty: 1 | Refills: 2 | Status: ERX | COMMUNITY
Start: 2020-11-25

## 2020-11-25 ASSESSMENT — LOCATION ZONE DERM
LOCATION ZONE: SCALP
LOCATION ZONE: FACE

## 2020-11-25 ASSESSMENT — LOCATION SIMPLE DESCRIPTION DERM
LOCATION SIMPLE: ANTERIOR SCALP
LOCATION SIMPLE: RIGHT CHEEK

## 2020-11-25 ASSESSMENT — LOCATION DETAILED DESCRIPTION DERM
LOCATION DETAILED: MID-FRONTAL SCALP
LOCATION DETAILED: RIGHT SUPERIOR LATERAL BUCCAL CHEEK

## 2020-11-25 NOTE — PROCEDURE: BIOPSY BY SHAVE METHOD
Hide Anticipated Plan (Based On Presumed Biopsy Results)?: No
Billing Type: Third-Party Bill
Anesthesia Type: 1% lidocaine with epinephrine
Silver Nitrate Text: The wound bed was treated with silver nitrate after the biopsy was performed.
Type Of Destruction Used: Curettage
Curettage Text: The wound bed was treated with curettage after the biopsy was performed.
Consent: Written consent was obtained and risks were reviewed including but not limited to scarring, infection, bleeding, scabbing, incomplete removal, nerve damage and allergy to anesthesia.
Information: Selecting Yes will display possible errors in your note based on the variables you have selected. This validation is only offered as a suggestion for you. PLEASE NOTE THAT THE VALIDATION TEXT WILL BE REMOVED WHEN YOU FINALIZE YOUR NOTE. IF YOU WANT TO FAX A PRELIMINARY NOTE YOU WILL NEED TO TOGGLE THIS TO 'NO' IF YOU DO NOT WANT IT IN YOUR FAXED NOTE.
Post-Care Instructions: I reviewed with the patient in detail post-care instructions. Patient is to keep the biopsy site dry overnight, and then apply bacitracin twice daily until healed. Patient may apply hydrogen peroxide soaks to remove any crusting.
Anesthesia Volume In Cc (Will Not Render If 0): 0.5
Electrodesiccation Text: The wound bed was treated with electrodesiccation after the biopsy was performed.
X Size Of Lesion In Cm: 0
Cryotherapy Text: The wound bed was treated with cryotherapy after the biopsy was performed.
Notification Instructions: Patient will be notified of biopsy results. However, patient instructed to call the office if not contacted within 2 weeks.
Hemostasis: Drysol
Wound Care: Petrolatum
Dressing: bandage
Electrodesiccation And Curettage Text: The wound bed was treated with electrodesiccation and curettage after the biopsy was performed.
Was A Bandage Applied: Yes
Detail Level: Detailed
Depth Of Biopsy: dermis
Biopsy Method: Dermablade
Biopsy Type: H and E

## 2021-01-04 ENCOUNTER — APPOINTMENT (OUTPATIENT)
Dept: URBAN - METROPOLITAN AREA CLINIC 259 | Age: 81
Setting detail: DERMATOLOGY
End: 2021-01-04

## 2021-01-04 DIAGNOSIS — L81.4 OTHER MELANIN HYPERPIGMENTATION: ICD-10-CM

## 2021-01-04 DIAGNOSIS — L57.8 OTHER SKIN CHANGES DUE TO CHRONIC EXPOSURE TO NONIONIZING RADIATION: ICD-10-CM

## 2021-01-04 PROBLEM — C44.319 BASAL CELL CARCINOMA OF SKIN OF OTHER PARTS OF FACE: Status: ACTIVE | Noted: 2021-01-04

## 2021-01-04 PROCEDURE — OTHER SUNSCREEN RECOMMENDATIONS: OTHER

## 2021-01-04 PROCEDURE — OTHER MIPS QUALITY: OTHER

## 2021-01-04 PROCEDURE — OTHER MOHS BY LAYER: OTHER

## 2021-01-04 PROCEDURE — 17311 MOHS 1 STAGE H/N/HF/G: CPT

## 2021-01-04 PROCEDURE — 99213 OFFICE O/P EST LOW 20 MIN: CPT | Mod: 25

## 2021-01-04 PROCEDURE — OTHER COUNSELING: OTHER

## 2021-01-04 PROCEDURE — 13132 CMPLX RPR F/C/C/M/N/AX/G/H/F: CPT

## 2021-01-04 ASSESSMENT — LOCATION ZONE DERM
LOCATION ZONE: FACE
LOCATION ZONE: TRUNK

## 2021-01-04 ASSESSMENT — LOCATION DETAILED DESCRIPTION DERM
LOCATION DETAILED: RIGHT INFERIOR MEDIAL UPPER BACK
LOCATION DETAILED: RIGHT CENTRAL MALAR CHEEK

## 2021-01-04 ASSESSMENT — LOCATION SIMPLE DESCRIPTION DERM
LOCATION SIMPLE: RIGHT UPPER BACK
LOCATION SIMPLE: RIGHT CHEEK

## 2021-01-04 NOTE — PROCEDURE: MOHS BY LAYER
Complex Requirements: Extensive Undermining Performed?: Yes
Body Location Override (Optional - Billing Will Still Be Based On Selected Body Map Location If Applicable): Right Lateral Superior Buccal Cheek
Stage 8: Number Of Sections (Blocks) ?: 0
Distance Of Undermining In Cm (Required): 1
Nostril Rim Text: The closure involved the nostril rim.
Complex Requirements: Exposure Of Vital Structure?: No
Retention Suture Text: Retention sutures were placed to support the closure and prevent dehiscence.
Stage 1: Depth Of Layer: adipose tissue
Suture Removal: 7 days
Epidermal Sutures: 6-0 Nylon
Undermining Type: Entire Wound
Deep Sutures: 5-0 Vicryl
Epidermal Closure: simple interrupted
Hemostasis: Electrocautery
Repair Type: Complex Repair
Size Of Lesion: 0.9
Wound Care: Petrolatum
Anesthesia Type: 1% lidocaine with epinephrine
Detail Level: Detailed
Estimated Blood Loss (Cc): minimal
Debridement Text: The wound edges were debrided prior to proceeding with the closure to facilitate wound healing.
Helical Rim Text: The closure involved the helical rim.
Stage 1: Number Of Sections (Blocks) ?: 2
Consent: The rationale for Mohs was explained to the patient and consent was obtained. The risks, benefits and alternatives to therapy were discussed in detail. Specifically, the risks of infection, scarring, bleeding, prolonged wound healing, incomplete removal, allergy to anesthesia, nerve injury and recurrence were addressed. Prior to the procedure, the treatment site was clearly identified and confirmed by the patient. All components of Universal Protocol/PAUSE Rule completed.
Stage 1: Defect X-Dimension: 1.5
Simple / Intermediate / Complex Repair - Final Wound Length In Cm: 2.7
Vermilion Border Text: The closure involved the vermilion border.
Post-Care Instructions: I reviewed with the patient in detail post-care instructions. Patient is not to engage in any heavy lifting, exercise, or swimming for the next 14 days. Should the patient develop any fevers, chills, bleeding, severe pain patient will contact the office immediately.

## 2021-01-11 ENCOUNTER — APPOINTMENT (OUTPATIENT)
Dept: URBAN - METROPOLITAN AREA CLINIC 254 | Age: 81
Setting detail: DERMATOLOGY
End: 2021-01-11

## 2021-01-11 DIAGNOSIS — Z48.02 ENCOUNTER FOR REMOVAL OF SUTURES: ICD-10-CM

## 2021-01-11 PROCEDURE — OTHER COUNSELING: OTHER

## 2021-01-11 PROCEDURE — OTHER SUTURE REMOVAL (GLOBAL PERIOD): OTHER

## 2021-01-11 ASSESSMENT — LOCATION ZONE DERM: LOCATION ZONE: FACE

## 2021-01-11 ASSESSMENT — LOCATION SIMPLE DESCRIPTION DERM: LOCATION SIMPLE: CHIN

## 2021-01-11 ASSESSMENT — LOCATION DETAILED DESCRIPTION DERM: LOCATION DETAILED: RIGHT CHIN

## 2021-01-11 NOTE — PROCEDURE: SUTURE REMOVAL (GLOBAL PERIOD)
Detail Level: Detailed
Body Location Override (Optional - Billing Will Still Be Based On Selected Body Map Location If Applicable): right lateral superior buccal cheek
Add 02152 Cpt? (Important Note: In 2017 The Use Of 60062 Is Being Tracked By Cms To Determine Future Global Period Reimbursement For Global Periods): no

## 2021-02-18 ENCOUNTER — APPOINTMENT (OUTPATIENT)
Dept: URBAN - METROPOLITAN AREA CLINIC 254 | Age: 81
Setting detail: DERMATOLOGY
End: 2021-02-20

## 2021-02-18 VITALS — RESPIRATION RATE: 14 BRPM | HEIGHT: 68 IN | WEIGHT: 143 LBS

## 2021-02-18 DIAGNOSIS — Z71.89 OTHER SPECIFIED COUNSELING: ICD-10-CM

## 2021-02-18 DIAGNOSIS — L57.0 ACTINIC KERATOSIS: ICD-10-CM

## 2021-02-18 PROBLEM — D48.5 NEOPLASM OF UNCERTAIN BEHAVIOR OF SKIN: Status: ACTIVE | Noted: 2021-02-18

## 2021-02-18 PROCEDURE — 11102 TANGNTL BX SKIN SINGLE LES: CPT

## 2021-02-18 PROCEDURE — OTHER ADDITIONAL NOTES: OTHER

## 2021-02-18 PROCEDURE — OTHER BIOPSY BY SHAVE METHOD: OTHER

## 2021-02-18 PROCEDURE — 88305 TISSUE EXAM BY PATHOLOGIST: CPT

## 2021-02-18 PROCEDURE — OTHER PATHOLOGY BILLING: OTHER

## 2021-02-18 PROCEDURE — 99212 OFFICE O/P EST SF 10 MIN: CPT | Mod: 25

## 2021-02-18 PROCEDURE — OTHER COUNSELING: OTHER

## 2021-02-18 ASSESSMENT — LOCATION ZONE DERM
LOCATION ZONE: NOSE
LOCATION ZONE: SCALP
LOCATION ZONE: TRUNK

## 2021-02-18 ASSESSMENT — LOCATION DETAILED DESCRIPTION DERM
LOCATION DETAILED: RIGHT MEDIAL UPPER BACK
LOCATION DETAILED: NASAL SUPRATIP
LOCATION DETAILED: RIGHT SUPERIOR PARIETAL SCALP

## 2021-02-18 ASSESSMENT — LOCATION SIMPLE DESCRIPTION DERM
LOCATION SIMPLE: NOSE
LOCATION SIMPLE: SCALP
LOCATION SIMPLE: RIGHT UPPER BACK

## 2021-02-18 NOTE — PROCEDURE: ADDITIONAL NOTES
Detail Level: Simple
Additional Notes: Recommended FBE as patient hasn’t had one in awhile. Patient will schedule if desired
Render Risk Assessment In Note?: no
Additional Notes: Patient has been applying urea to scalp. KL reassured patient his scalp is improving and to continue urea. Also briefly discussed fluorouracil 5% cream, and that this would be beneficial treating precancerous lesions on his scalp. Will wait to further discuss this pending nose biopsy today

## 2021-02-18 NOTE — PROCEDURE: PATHOLOGY BILLING
Rendering Text In Billing: The slides will be read by [employer] and reported in the attached document. Rendering Text In Billing: The slides will be read by [employer] and reported in the attached document.

## 2021-02-18 NOTE — PROCEDURE: PATHOLOGY BILLING
Immunohistochemistry ([id number] and [id number]) billing is not performed here. Please use the Immunohistochemistry Stain Billing plan to accomplish this. Immunohistochemistry ([id number] and [id number]) billing is not performed here. Please use the Immunohistochemistry Stain Billing plan to accomplish this.

## 2021-03-03 ENCOUNTER — RX ONLY (RX ONLY)
Age: 81
End: 2021-03-03

## 2021-03-03 RX ORDER — IMIQUIMOD 50 MG/G
CREAM TOPICAL
Qty: 8 | Refills: 0 | Status: ERX

## 2021-05-05 ENCOUNTER — APPOINTMENT (OUTPATIENT)
Dept: URBAN - METROPOLITAN AREA CLINIC 254 | Age: 81
Setting detail: DERMATOLOGY
End: 2021-05-06

## 2021-05-05 VITALS — RESPIRATION RATE: 16 BRPM | WEIGHT: 135 LBS | HEIGHT: 68 IN

## 2021-05-05 DIAGNOSIS — L57.0 ACTINIC KERATOSIS: ICD-10-CM

## 2021-05-05 PROBLEM — D04.39 CARCINOMA IN SITU OF SKIN OF OTHER PARTS OF FACE: Status: ACTIVE | Noted: 2021-05-05

## 2021-05-05 PROCEDURE — 99213 OFFICE O/P EST LOW 20 MIN: CPT

## 2021-05-05 PROCEDURE — OTHER COUNSELING: OTHER

## 2021-05-05 PROCEDURE — OTHER DEFER: OTHER

## 2021-05-05 ASSESSMENT — LOCATION SIMPLE DESCRIPTION DERM: LOCATION SIMPLE: RIGHT SCALP

## 2021-05-05 ASSESSMENT — LOCATION DETAILED DESCRIPTION DERM: LOCATION DETAILED: RIGHT MEDIAL FRONTAL SCALP

## 2021-05-05 ASSESSMENT — LOCATION ZONE DERM: LOCATION ZONE: SCALP

## 2021-05-05 NOTE — PROCEDURE: COUNSELING
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
Detail Level: Detailed

## 2021-05-05 NOTE — PROCEDURE: DEFER
Instructions (Optional): -Recommend recheck in 5 months and consider treatment.
Procedure To Be Performed At Next Visit: Cryotherapy
Detail Level: Detailed
Introduction Text (Please End With A Colon): The following procedure was deferred:

## 2021-07-29 ENCOUNTER — APPOINTMENT (OUTPATIENT)
Dept: URBAN - METROPOLITAN AREA CLINIC 254 | Age: 81
Setting detail: DERMATOLOGY
End: 2021-08-02

## 2021-07-29 VITALS — WEIGHT: 135 LBS | RESPIRATION RATE: 18 BRPM | HEIGHT: 68 IN

## 2021-07-29 DIAGNOSIS — Z71.89 OTHER SPECIFIED COUNSELING: ICD-10-CM

## 2021-07-29 DIAGNOSIS — L57.0 ACTINIC KERATOSIS: ICD-10-CM

## 2021-07-29 DIAGNOSIS — D49.2 NEOPLASM OF UNSPECIFIED BEHAVIOR OF BONE, SOFT TISSUE, AND SKIN: ICD-10-CM

## 2021-07-29 PROBLEM — C44.92 SQUAMOUS CELL CARCINOMA OF SKIN, UNSPECIFIED: Status: ACTIVE | Noted: 2021-07-29

## 2021-07-29 PROCEDURE — 99212 OFFICE O/P EST SF 10 MIN: CPT | Mod: 25

## 2021-07-29 PROCEDURE — OTHER PATHOLOGY BILLING: OTHER

## 2021-07-29 PROCEDURE — 11102 TANGNTL BX SKIN SINGLE LES: CPT

## 2021-07-29 PROCEDURE — OTHER BIOPSY BY SHAVE METHOD: OTHER

## 2021-07-29 PROCEDURE — 11103 TANGNTL BX SKIN EA SEP/ADDL: CPT

## 2021-07-29 PROCEDURE — OTHER LIQUID NITROGEN: OTHER

## 2021-07-29 PROCEDURE — 17003 DESTRUCT PREMALG LES 2-14: CPT

## 2021-07-29 PROCEDURE — OTHER ADDITIONAL NOTES: OTHER

## 2021-07-29 PROCEDURE — 17000 DESTRUCT PREMALG LESION: CPT | Mod: 59

## 2021-07-29 PROCEDURE — OTHER COUNSELING: OTHER

## 2021-07-29 PROCEDURE — 88305 TISSUE EXAM BY PATHOLOGIST: CPT

## 2021-07-29 ASSESSMENT — LOCATION DETAILED DESCRIPTION DERM
LOCATION DETAILED: LEFT VENTRAL PROXIMAL FOREARM
LOCATION DETAILED: RIGHT SUPERIOR FOREHEAD
LOCATION DETAILED: LEFT DISTAL DORSAL FOREARM
LOCATION DETAILED: LEFT PROXIMAL DORSAL FOREARM
LOCATION DETAILED: LEFT VENTRAL LATERAL DISTAL FOREARM
LOCATION DETAILED: LEFT CENTRAL MALAR CHEEK

## 2021-07-29 ASSESSMENT — LOCATION SIMPLE DESCRIPTION DERM
LOCATION SIMPLE: RIGHT FOREHEAD
LOCATION SIMPLE: LEFT CHEEK
LOCATION SIMPLE: LEFT FOREARM

## 2021-07-29 ASSESSMENT — LOCATION ZONE DERM
LOCATION ZONE: FACE
LOCATION ZONE: ARM

## 2021-07-29 NOTE — PROCEDURE: BIOPSY BY SHAVE METHOD
Type Of Destruction Used: Curettage
Biopsy Method: Dermablade
Render Path Notes In Note?: No
Additional Anesthesia Volume In Cc (Will Not Render If 0): 0
Anesthesia Type: 1% lidocaine with epinephrine
Electrodesiccation And Curettage Text: The wound bed was treated with electrodesiccation and curettage after the biopsy was performed.
Information: Selecting Yes will display possible errors in your note based on the variables you have selected. This validation is only offered as a suggestion for you. PLEASE NOTE THAT THE VALIDATION TEXT WILL BE REMOVED WHEN YOU FINALIZE YOUR NOTE. IF YOU WANT TO FAX A PRELIMINARY NOTE YOU WILL NEED TO TOGGLE THIS TO 'NO' IF YOU DO NOT WANT IT IN YOUR FAXED NOTE.
Anesthesia Volume In Cc (Will Not Render If 0): 0.5
Hemostasis: Drysol
Cryotherapy Text: The wound bed was treated with cryotherapy after the biopsy was performed.
Was A Bandage Applied: Yes
Curettage Text: The wound bed was treated with curettage after the biopsy was performed.
Consent: Written consent was obtained and risks were reviewed including but not limited to scarring, infection, bleeding, scabbing, incomplete removal, nerve damage and allergy to anesthesia.
Dressing: bandage
Post-Care Instructions: I reviewed with the patient in detail post-care instructions. Patient is to keep the biopsy site dry overnight, and then apply bacitracin twice daily until healed. Patient may apply hydrogen peroxide soaks to remove any crusting.
Wound Care: Petrolatum
Biopsy Type: H and E
Depth Of Biopsy: dermis
Notification Instructions: Patient will be notified of biopsy results. However, patient instructed to call the office if not contacted within 2 weeks.
Detail Level: Detailed
Silver Nitrate Text: The wound bed was treated with silver nitrate after the biopsy was performed.
Electrodesiccation Text: The wound bed was treated with electrodesiccation after the biopsy was performed.
Billing Type: Third-Party Bill

## 2021-07-29 NOTE — PROCEDURE: ADDITIONAL NOTES
Detail Level: Simple
Additional Notes: Disc KA vs BCC and treatment options of Mohs micrographic surgery or SRT. Tae is leaning towards Mohs due to fewer number of visits and good success with recent Mohs on his chin.
Render Risk Assessment In Note?: no

## 2021-07-29 NOTE — PROCEDURE: PATHOLOGY BILLING
Rendering Text In Billing: The slides will be read by [employer] and reported in the attached document. Rendering Text In Billing: The slides will be read by [employer] and reported in the attached document.

## 2021-07-29 NOTE — PROCEDURE: PATHOLOGY BILLING
Immunohistochemistry ([id number] and [id number]) billing is not performed here. Please use the Immunohistochemistry Stain Billing plan to accomplish this. Immunohistochemistry ([id number] and [id number]) billing is not performed here. Please use the Immunohistochemistry Stain Billing plan to accomplish this.

## 2021-07-29 NOTE — PROCEDURE: LIQUID NITROGEN
Render Note In Bullet Format When Appropriate: No
Consent: The patient's consent was obtained including but not limited to risks of crusting, scabbing, blistering, scarring, darker or lighter pigmentary change, recurrence, incomplete removal and infection.
Number Of Freeze-Thaw Cycles: 2 freeze-thaw cycles
Detail Level: Detailed
Show Aperture Variable?: Yes
Post-Care Instructions: I reviewed with the patient in detail post-care instructions. Patient is to wear sunprotection, and avoid picking at any of the treated lesions. Pt may apply Vaseline to crusted or scabbing areas.
Duration Of Freeze Thaw-Cycle (Seconds): 3

## 2021-08-27 ENCOUNTER — APPOINTMENT (OUTPATIENT)
Dept: URBAN - METROPOLITAN AREA CLINIC 259 | Age: 81
Setting detail: DERMATOLOGY
End: 2021-08-27

## 2021-08-27 DIAGNOSIS — L57.8 OTHER SKIN CHANGES DUE TO CHRONIC EXPOSURE TO NONIONIZING RADIATION: ICD-10-CM

## 2021-08-27 PROBLEM — C44.329 SQUAMOUS CELL CARCINOMA OF SKIN OF OTHER PARTS OF FACE: Status: ACTIVE | Noted: 2021-08-27

## 2021-08-27 PROCEDURE — OTHER MIPS QUALITY: OTHER

## 2021-08-27 PROCEDURE — 13132 CMPLX RPR F/C/C/M/N/AX/G/H/F: CPT

## 2021-08-27 PROCEDURE — 17311 MOHS 1 STAGE H/N/HF/G: CPT

## 2021-08-27 PROCEDURE — 99212 OFFICE O/P EST SF 10 MIN: CPT | Mod: 25

## 2021-08-27 PROCEDURE — OTHER MOHS BY LAYER: OTHER

## 2021-08-27 PROCEDURE — OTHER COUNSELING: OTHER

## 2021-08-27 ASSESSMENT — LOCATION DETAILED DESCRIPTION DERM: LOCATION DETAILED: INFERIOR MID FOREHEAD

## 2021-08-27 ASSESSMENT — LOCATION SIMPLE DESCRIPTION DERM: LOCATION SIMPLE: INFERIOR FOREHEAD

## 2021-08-27 ASSESSMENT — LOCATION ZONE DERM: LOCATION ZONE: FACE

## 2021-08-27 NOTE — PROCEDURE: MOHS BY LAYER
Size Of Lesion: 1.9
Complex Requirements: Debridement Of Wound Edges?: Yes
Stage 5: Number Of Sections (Blocks) ?: 0
Epidermal Closure: simple interrupted
Body Location Override (Optional - Billing Will Still Be Based On Selected Body Map Location If Applicable): right central superior forehead
Helical Rim Text: The closure involved the helical rim.
Width Of Defect Perpendicular To Closure In Cm (Required): 1
Debridement Text: The wound edges were debrided prior to proceeding with the closure to facilitate wound healing.
Anesthesia Type: 1% lidocaine with epinephrine
Consent: The rationale for Mohs was explained to the patient and consent was obtained. The risks, benefits and alternatives to therapy were discussed in detail. Specifically, the risks of infection, scarring, bleeding, prolonged wound healing, incomplete removal, allergy to anesthesia, nerve injury and recurrence were addressed. Prior to the procedure, the treatment site was clearly identified and confirmed by the patient. All components of Universal Protocol/PAUSE Rule completed.
Bill For Surgical Tray: no
Stage 1: Number Of Sections (Blocks) ?: 2
Undermining Type: Entire Wound
Post-Care Instructions: I reviewed with the patient in detail post-care instructions. Patient is not to engage in any heavy lifting, exercise, or swimming for the next 14 days. Should the patient develop any fevers, chills, bleeding, severe pain patient will contact the office immediately.
Vermilion Border Text: The closure involved the vermilion border.
Stage 1: Depth Of Layer: adipose tissue
Repair Type: Complex Repair
Epidermal Sutures: 3-0 Nylon
Nostril Rim Text: The closure involved the nostril rim.
Estimated Blood Loss (Cc): minimal
Wound Care: Petrolatum
Stage 1: Defect Y-Dimension: 1.7
Detail Level: Detailed
Simple / Intermediate / Complex Repair - Final Wound Length In Cm: 3
Suture Removal: 21 days
Hemostasis: Electrocautery
Retention Suture Text: Retention sutures were placed to support the closure and prevent dehiscence.

## 2021-09-17 ENCOUNTER — APPOINTMENT (OUTPATIENT)
Dept: URBAN - METROPOLITAN AREA CLINIC 259 | Age: 81
Setting detail: DERMATOLOGY
End: 2021-09-17

## 2021-09-17 DIAGNOSIS — Z48.02 ENCOUNTER FOR REMOVAL OF SUTURES: ICD-10-CM

## 2021-09-17 PROCEDURE — OTHER SUTURE REMOVAL (GLOBAL PERIOD): OTHER

## 2021-09-17 ASSESSMENT — LOCATION SIMPLE DESCRIPTION DERM: LOCATION SIMPLE: SCALP

## 2021-09-17 ASSESSMENT — LOCATION ZONE DERM: LOCATION ZONE: SCALP

## 2021-09-17 ASSESSMENT — LOCATION DETAILED DESCRIPTION DERM: LOCATION DETAILED: RIGHT CENTRAL PARIETAL SCALP

## 2021-09-17 NOTE — PROCEDURE: SUTURE REMOVAL (GLOBAL PERIOD)
Detail Level: Detailed
Add 43101 Cpt? (Important Note: In 2017 The Use Of 17277 Is Being Tracked By Cms To Determine Future Global Period Reimbursement For Global Periods): no

## 2021-10-04 ENCOUNTER — APPOINTMENT (OUTPATIENT)
Dept: URBAN - METROPOLITAN AREA CLINIC 259 | Age: 81
Setting detail: DERMATOLOGY
End: 2021-10-04

## 2021-10-04 DIAGNOSIS — L90.5 SCAR CONDITIONS AND FIBROSIS OF SKIN: ICD-10-CM

## 2021-10-04 DIAGNOSIS — L57.8 OTHER SKIN CHANGES DUE TO CHRONIC EXPOSURE TO NONIONIZING RADIATION: ICD-10-CM

## 2021-10-04 PROBLEM — C44.329 SQUAMOUS CELL CARCINOMA OF SKIN OF OTHER PARTS OF FACE: Status: ACTIVE | Noted: 2021-10-04

## 2021-10-04 PROCEDURE — OTHER MIPS QUALITY: OTHER

## 2021-10-04 PROCEDURE — 13132 CMPLX RPR F/C/C/M/N/AX/G/H/F: CPT

## 2021-10-04 PROCEDURE — 17311 MOHS 1 STAGE H/N/HF/G: CPT

## 2021-10-04 PROCEDURE — OTHER MOHS BY LAYER: OTHER

## 2021-10-04 PROCEDURE — 99213 OFFICE O/P EST LOW 20 MIN: CPT | Mod: 25

## 2021-10-04 PROCEDURE — OTHER COUNSELING: OTHER

## 2021-10-04 PROCEDURE — 17312 MOHS ADDL STAGE: CPT

## 2021-10-04 ASSESSMENT — LOCATION DETAILED DESCRIPTION DERM
LOCATION DETAILED: RIGHT MID-UPPER BACK
LOCATION DETAILED: RIGHT FOREHEAD

## 2021-10-04 ASSESSMENT — LOCATION SIMPLE DESCRIPTION DERM
LOCATION SIMPLE: RIGHT UPPER BACK
LOCATION SIMPLE: RIGHT FOREHEAD

## 2021-10-04 ASSESSMENT — LOCATION ZONE DERM
LOCATION ZONE: FACE
LOCATION ZONE: TRUNK

## 2021-10-04 NOTE — PROCEDURE: MOHS BY LAYER
Retention Suture Text: Retention sutures were placed to support the closure and prevent dehiscence.
Stage 10: Number Of Sections (Blocks) ?: 0
Repair Type: Complex Repair
Detail Level: Detailed
Complex Requirements: Exposure Of Vital Structure?: No
Stage 2: Number Of Sections (Blocks) ?: 1
Wound Care: Petrolatum
Complex Requirements: Extensive Undermining Performed?: Yes
Distance Of Undermining In Cm (Required): 1.2
Consent: The rationale for Mohs was explained to the patient and consent was obtained. The risks, benefits and alternatives to therapy were discussed in detail. Specifically, the risks of infection, scarring, bleeding, prolonged wound healing, incomplete removal, allergy to anesthesia, nerve injury and recurrence were addressed. Prior to the procedure, the treatment site was clearly identified and confirmed by the patient. All components of Universal Protocol/PAUSE Rule completed.
Stage 1: Number Of Sections (Blocks) ?: 2
Vermilion Border Text: The closure involved the vermilion border.
Epidermal Sutures: 6-0 Nylon
Stage 1: Depth Of Layer: adipose tissue
Post-Care Instructions: I reviewed with the patient in detail post-care instructions. Patient is not to engage in any heavy lifting, exercise, or swimming for the next 14 days. Should the patient develop any fevers, chills, bleeding, severe pain patient will contact the office immediately.
Nostril Rim Text: The closure involved the nostril rim.
Anesthesia Type: 1% lidocaine with epinephrine
Deep Sutures: 5-0 Vicryl
Helical Rim Text: The closure involved the helical rim.
Estimated Blood Loss (Cc): minimal
Stage 2: Defect X-Dimension: 1.5
Undermining Type: Entire Wound
Suture Removal: 7 days
Hemostasis: Electrocautery
Body Location Override (Optional - Billing Will Still Be Based On Selected Body Map Location If Applicable): Left Central Malar Cheek
Simple / Intermediate / Complex Repair - Final Wound Length In Cm: 4
Epidermal Closure: running
Debridement Text: The wound edges were debrided prior to proceeding with the closure to facilitate wound healing.

## 2021-10-04 NOTE — PROCEDURE: MIPS QUALITY
Quality 431: Preventive Care And Screening: Unhealthy Alcohol Use - Screening: Patient screened for unhealthy alcohol use using a single question and scores less than 2 times per year
Detail Level: Detailed
Quality 110: Preventive Care And Screening: Influenza Immunization: Influenza Immunization Ordered or Recommended, but not Administered due to system reason
Quality 226: Preventive Care And Screening: Tobacco Use: Screening And Cessation Intervention: Patient screened for tobacco use and is an ex/non-smoker
Quality 130: Documentation Of Current Medications In The Medical Record: Current Medications Documented
Quality 111:Pneumonia Vaccination Status For Older Adults: Pneumococcal Vaccination not Administered or Previously Received, Reason not Otherwise Specified

## 2021-10-11 ENCOUNTER — APPOINTMENT (OUTPATIENT)
Dept: URBAN - METROPOLITAN AREA CLINIC 254 | Age: 81
Setting detail: DERMATOLOGY
End: 2022-01-31

## 2021-10-11 DIAGNOSIS — Z48.02 ENCOUNTER FOR REMOVAL OF SUTURES: ICD-10-CM

## 2021-10-11 PROCEDURE — OTHER SUTURE REMOVAL (GLOBAL PERIOD): OTHER

## 2021-10-11 ASSESSMENT — LOCATION SIMPLE DESCRIPTION DERM: LOCATION SIMPLE: LEFT CHEEK

## 2021-10-11 ASSESSMENT — LOCATION ZONE DERM: LOCATION ZONE: FACE

## 2021-10-11 ASSESSMENT — LOCATION DETAILED DESCRIPTION DERM: LOCATION DETAILED: LEFT INFERIOR CENTRAL MALAR CHEEK

## 2021-10-11 NOTE — PROCEDURE: SUTURE REMOVAL (GLOBAL PERIOD)
Add 62728 Cpt? (Important Note: In 2017 The Use Of 85481 Is Being Tracked By Cms To Determine Future Global Period Reimbursement For Global Periods): no
Detail Level: Detailed

## 2021-12-21 ENCOUNTER — APPOINTMENT (OUTPATIENT)
Dept: URBAN - METROPOLITAN AREA CLINIC 254 | Age: 81
Setting detail: DERMATOLOGY
End: 2021-12-28

## 2021-12-21 VITALS — HEIGHT: 68 IN | WEIGHT: 130 LBS

## 2021-12-21 DIAGNOSIS — Z71.89 OTHER SPECIFIED COUNSELING: ICD-10-CM

## 2021-12-21 DIAGNOSIS — L57.0 ACTINIC KERATOSIS: ICD-10-CM

## 2021-12-21 DIAGNOSIS — L81.4 OTHER MELANIN HYPERPIGMENTATION: ICD-10-CM

## 2021-12-21 DIAGNOSIS — D22 MELANOCYTIC NEVI: ICD-10-CM

## 2021-12-21 DIAGNOSIS — L82.0 INFLAMED SEBORRHEIC KERATOSIS: ICD-10-CM

## 2021-12-21 DIAGNOSIS — L82.1 OTHER SEBORRHEIC KERATOSIS: ICD-10-CM

## 2021-12-21 DIAGNOSIS — D18.0 HEMANGIOMA: ICD-10-CM

## 2021-12-21 PROBLEM — D22.4 MELANOCYTIC NEVI OF SCALP AND NECK: Status: ACTIVE | Noted: 2021-12-21

## 2021-12-21 PROBLEM — D18.01 HEMANGIOMA OF SKIN AND SUBCUTANEOUS TISSUE: Status: ACTIVE | Noted: 2021-12-21

## 2021-12-21 PROBLEM — D22.5 MELANOCYTIC NEVI OF TRUNK: Status: ACTIVE | Noted: 2021-12-21

## 2021-12-21 PROCEDURE — 99213 OFFICE O/P EST LOW 20 MIN: CPT | Mod: 25

## 2021-12-21 PROCEDURE — 17000 DESTRUCT PREMALG LESION: CPT | Mod: 59

## 2021-12-21 PROCEDURE — OTHER MONITORING: OTHER

## 2021-12-21 PROCEDURE — OTHER COUNSELING: OTHER

## 2021-12-21 PROCEDURE — OTHER MIPS QUALITY: OTHER

## 2021-12-21 PROCEDURE — OTHER LIQUID NITROGEN: OTHER

## 2021-12-21 PROCEDURE — 17003 DESTRUCT PREMALG LES 2-14: CPT | Mod: 59

## 2021-12-21 PROCEDURE — 17110 DESTRUCT B9 LESION 1-14: CPT

## 2021-12-21 ASSESSMENT — LOCATION DETAILED DESCRIPTION DERM
LOCATION DETAILED: EPIGASTRIC SKIN
LOCATION DETAILED: STERNUM
LOCATION DETAILED: LEFT SUPERIOR PARIETAL SCALP
LOCATION DETAILED: LEFT DISTAL DORSAL FOREARM
LOCATION DETAILED: LEFT INFERIOR ANTERIOR NECK
LOCATION DETAILED: LEFT INFERIOR UPPER BACK
LOCATION DETAILED: RIGHT MEDIAL TRAPEZIAL NECK
LOCATION DETAILED: RIGHT PROXIMAL DORSAL FOREARM
LOCATION DETAILED: LEFT MEDIAL FRONTAL SCALP
LOCATION DETAILED: NASAL SUPRATIP
LOCATION DETAILED: SUPERIOR THORACIC SPINE
LOCATION DETAILED: RIGHT SUPERIOR UPPER BACK
LOCATION DETAILED: RIGHT MEDIAL FRONTAL SCALP
LOCATION DETAILED: POSTERIOR MID-PARIETAL SCALP
LOCATION DETAILED: RIGHT SUPERIOR PARIETAL SCALP
LOCATION DETAILED: LEFT MEDIAL SUPERIOR CHEST

## 2021-12-21 ASSESSMENT — LOCATION SIMPLE DESCRIPTION DERM
LOCATION SIMPLE: POSTERIOR NECK
LOCATION SIMPLE: LEFT UPPER BACK
LOCATION SIMPLE: RIGHT FOREARM
LOCATION SIMPLE: POSTERIOR SCALP
LOCATION SIMPLE: SCALP
LOCATION SIMPLE: RIGHT SCALP
LOCATION SIMPLE: NOSE
LOCATION SIMPLE: ABDOMEN
LOCATION SIMPLE: LEFT FOREARM
LOCATION SIMPLE: RIGHT UPPER BACK
LOCATION SIMPLE: LEFT SCALP
LOCATION SIMPLE: UPPER BACK
LOCATION SIMPLE: CHEST
LOCATION SIMPLE: LEFT ANTERIOR NECK

## 2021-12-21 ASSESSMENT — LOCATION ZONE DERM
LOCATION ZONE: NECK
LOCATION ZONE: TRUNK
LOCATION ZONE: SCALP
LOCATION ZONE: ARM
LOCATION ZONE: NOSE

## 2021-12-21 NOTE — PROCEDURE: LIQUID NITROGEN
Post-Care Instructions: I reviewed with the patient in detail post-care instructions. Patient is to wear sunprotection, and avoid picking at any of the treated lesions. Pt may apply Vaseline to crusted or scabbing areas.
Consent: The patient's consent was obtained including but not limited to risks of crusting, scabbing, blistering, scarring, darker or lighter pigmentary change, recurrence, incomplete removal and infection.
Render Post-Care Instructions In Note?: no
Show Aperture Variable?: Yes
Detail Level: Detailed
Duration Of Freeze Thaw-Cycle (Seconds): 0
Medical Necessity Clause: This procedure was medically necessary because the lesions that were treated were:
Medical Necessity Information: It is in your best interest to select a reason for this procedure from the list below. All of these items fulfill various CMS LCD requirements except the new and changing color options.

## 2021-12-21 NOTE — PROCEDURE: COUNSELING
Met with pt. He states he was turned away from [facility] for lack of ID. When questioned further, he states she was referred to [facility] for Clarity ID. He did not go to [facility] because he was told they would provide ID only one time and he had already obtained one from them. Pt states he plans to obtain Real ID with brother's help when discharged. Pt will need to get established with PCP, will need F/U for heart failure and anticoagulation. Spoke to PFA and pt does qualify for MediCaid. It will take 48-72 hours to show in the system. Will not be able to schedule any F/U appointments until MediCaid is documented as in effect. Month supply of meds was provided to pt when discharged last week. Will require anticoagulants when discharged. Will need Case Management f/u to ensure that post acute care needs attended to prior to discharge.  
Detail Level: Zone
Sunscreen Recommendations: Elta MD or CeraVe AM
Detail Level: Generalized

## 2022-02-22 ENCOUNTER — APPOINTMENT (OUTPATIENT)
Dept: URBAN - METROPOLITAN AREA CLINIC 254 | Age: 82
Setting detail: DERMATOLOGY
End: 2022-02-27

## 2022-02-22 VITALS — HEIGHT: 68 IN | RESPIRATION RATE: 14 BRPM | WEIGHT: 130 LBS

## 2022-02-22 DIAGNOSIS — L57.0 ACTINIC KERATOSIS: ICD-10-CM

## 2022-02-22 DIAGNOSIS — D49.2 NEOPLASM OF UNSPECIFIED BEHAVIOR OF BONE, SOFT TISSUE, AND SKIN: ICD-10-CM

## 2022-02-22 PROCEDURE — OTHER SHAVE REMOVAL: OTHER

## 2022-02-22 PROCEDURE — OTHER PRESCRIPTION: OTHER

## 2022-02-22 PROCEDURE — OTHER MIPS QUALITY: OTHER

## 2022-02-22 PROCEDURE — OTHER COUNSELING: OTHER

## 2022-02-22 PROCEDURE — 11306 SHAVE SKIN LESION 0.6-1.0 CM: CPT

## 2022-02-22 PROCEDURE — 11311 SHAVE SKIN LESION 0.6-1.0 CM: CPT

## 2022-02-22 PROCEDURE — OTHER TREATMENT REGIMEN: OTHER

## 2022-02-22 PROCEDURE — 99213 OFFICE O/P EST LOW 20 MIN: CPT | Mod: 25

## 2022-02-22 PROCEDURE — 11306 SHAVE SKIN LESION 0.6-1.0 CM: CPT | Mod: 76

## 2022-02-22 RX ORDER — FLUOROURACIL 5 MG/G
CREAM TOPICAL
Qty: 40 | Refills: 0 | Status: ERX | COMMUNITY
Start: 2022-02-22

## 2022-02-22 ASSESSMENT — LOCATION DETAILED DESCRIPTION DERM
LOCATION DETAILED: RIGHT SUPERIOR PARIETAL SCALP
LOCATION DETAILED: LEFT CENTRAL FRONTAL SCALP
LOCATION DETAILED: NASAL DORSUM

## 2022-02-22 ASSESSMENT — LOCATION SIMPLE DESCRIPTION DERM
LOCATION SIMPLE: SCALP
LOCATION SIMPLE: LEFT SCALP
LOCATION SIMPLE: NOSE

## 2022-02-22 ASSESSMENT — LOCATION ZONE DERM
LOCATION ZONE: NOSE
LOCATION ZONE: SCALP

## 2022-02-22 NOTE — PROCEDURE: TREATMENT REGIMEN
Detail Level: Zone
Initiate Treatment: - 5FU 5% cream apply to scalp for 3-4 weeks then follow up in clinic one month after application

## 2022-02-22 NOTE — PROCEDURE: SHAVE REMOVAL
Render Post-Care Instructions In Note?: no
Consent was obtained from the patient. The risks and benefits to therapy were discussed in detail. Specifically, the risks of infection, scarring, bleeding, prolonged wound healing, incomplete removal, allergy to anesthesia, nerve injury and recurrence were addressed. Prior to the procedure, the treatment site was clearly identified and confirmed by the patient. All components of Universal Protocol/PAUSE Rule completed.
Hemostasis: Electrocautery and Aluminum Chloride
Size Of Lesion In Cm (Required): 0.6
Wound Care: Petrolatum
Medical Necessity Clause: This procedure was medically necessary because the lesion that was treated was:
Medical Necessity Information: It is in your best interest to select a reason for this procedure from the list below. All of these items fulfill various CMS LCD requirements except the new and changing color options.
Anesthesia Volume In Cc: 0.5
Detail Level: Detailed
Billing Type: Third-Party Bill
Biopsy Method: Dermablade
Was A Bandage Applied: Yes
Post-Care Instructions: I reviewed with the patient in detail post-care instructions. Patient is to keep the biopsy site dry overnight, and then apply bacitracin twice daily until healed. Patient may apply hydrogen peroxide soaks to remove any crusting.
Notification Instructions: Patient will be notified of pathology results. However, patient instructed to call the office if not contacted within 2 weeks.
X Size Of Lesion In Cm (Optional): 0
Anesthesia Type: 1% lidocaine with epinephrine

## 2022-03-29 ENCOUNTER — APPOINTMENT (OUTPATIENT)
Dept: URBAN - METROPOLITAN AREA CLINIC 254 | Age: 82
Setting detail: DERMATOLOGY
End: 2022-03-29

## 2022-03-29 VITALS — HEIGHT: 68 IN | WEIGHT: 130 LBS

## 2022-03-29 DIAGNOSIS — L57.0 ACTINIC KERATOSIS: ICD-10-CM

## 2022-03-29 PROBLEM — D04.4 CARCINOMA IN SITU OF SKIN OF SCALP AND NECK: Status: ACTIVE | Noted: 2022-03-29

## 2022-03-29 PROCEDURE — OTHER COUNSELING: OTHER

## 2022-03-29 PROCEDURE — 99212 OFFICE O/P EST SF 10 MIN: CPT | Mod: 25

## 2022-03-29 PROCEDURE — 17272 DSTR MAL LES S/N/H/F/G 1.1-2: CPT

## 2022-03-29 PROCEDURE — OTHER CURETTAGE AND DESTRUCTION: OTHER

## 2022-03-29 PROCEDURE — OTHER MIPS QUALITY: OTHER

## 2022-03-29 ASSESSMENT — LOCATION ZONE DERM: LOCATION ZONE: NOSE

## 2022-03-29 ASSESSMENT — LOCATION SIMPLE DESCRIPTION DERM: LOCATION SIMPLE: NOSE

## 2022-03-29 ASSESSMENT — LOCATION DETAILED DESCRIPTION DERM: LOCATION DETAILED: NASAL SUPRATIP

## 2022-03-29 NOTE — PROCEDURE: COUNSELING
Patient Specific Counseling (Will Not Stick From Patient To Patient): -\\n-  No AK present today so no LN2 treatment necessary
Detail Level: Detailed
Patient Specific Counseling (Will Not Stick From Patient To Patient): - Reviewed the pathology report with patient and discussed the recommendation for electrodessication and curettage.\\n- patient received biopsy site care sheet and advised he can leave the pressure dressing on for 24-48 hours then once he takes the pressure dressing off, he can care for the wound similar to a biopsy - Aquaphor application and a new bandaid daily for about 2 week or until there is no longer a wound. \\nPatient expressed understanding.

## 2022-03-29 NOTE — PROCEDURE: CURETTAGE AND DESTRUCTION
Bill For Surgical Tray: no
Bill As A Line Item Or As Units: Line Item
Anesthesia Volume In Cc: 1
Additional Information: (Optional): - Petrolatum ointment followed by a pressure dressing was applied. \\n- Patient was instructed to keep pressure bandage on for 24-48 hours and instructed to contact [facility] any fevers, chills, bleeding, or severe pain develop. \\n- The patient received detailed post-op wound care instructions.
Detail Level: Detailed
Post-Care Instructions: - Keep pressure bandage on for 24-48 hours. Should you develop any fevers, chills, bleeding, severe pain, contact [facility] immediately.\\n\\nWOUND CARE:\\nDo NOT submerge wound in a bath, swimming pool, or hot tub for at least 1 week or for as long as there is an open wound. Gently cleanse the site daily with mild soap and water. Be careful NOT to allow a forceful stream of water to hit the treatment site. After cleaning/showering, apply a thin layer of petrolatum ointment or Aquaphor in the wound followed by an adhesive bandage. Continue this process daily until healed. \\n\\nBLEEDING:\\nIf you develop persistent bleeding, apply firm and steady pressure over the dressing with gauze for 15 minutes. If bleeding persists, reapply pressure with an ice pack over the gauze for 15 minutes. NEVER APPLY ICE DIRECTLY TO THE SKIN. Do NOT peek under the gauze during these 15 minutes of pressure.  Call our office at [phone] if you cannot get the bleeding to stop. \\n\\nINFECTION:\\nSigns of infection may include increasing rather than decreasing pain (after the first few days), increasing redness/swelling/heat, draining pus, pink/red streaks around the wound, and fever or chills.  Please call our office immediately at [phone] if infection is suspected. It is normal to have some mild redness on or around the wound edges; this will lighten day by day and will become less tender.\\n\\nPAIN:\\nPain is usually minimal, but if needed you may take acetaminophen (Tylenol) every four hours as needed. Applying an ice pack over the dressing for 15-20 minutes every 2-3 hours will relieve swelling, lessen pain, and help minimize bruising. NEVER APPLY ICE DIRECTLY TO THE SKIN. Avoid ibuprofen (Advil, Motrin) and naproxen (Aleve) for the first 48 hours as these can increase bleeding.\\n\\nSWELLING AND BRUISING:\\nSwelling and bruising are common and temporary, usually lasting 1 - 2 weeks. It is more common in areas treated around the eyes, hands, and feet. In the days following the procedure, swelling and bruising can be minimized by keeping the affected areas elevated when possible, reducing salty foods, and applying ice packs over the dressing for 15-20 minutes every 2-3 hours. NEVER APPLY ICE DIRECTLY TO THE SKIN.\\n\\nITCHING:\\nItchiness on and around the wound is very common and can last several days to weeks after surgery. Mild itch is normal as the wound is healing. \\n\\nNERVE CHANGES:\\nNumbness is usually temporary, but it may last for several weeks to months. You may also experience sharp pains at the wound sight as it heals.  Mild pain is normal and will gradually improve with time.\\n \\nNO SMOKING:\\nSmoking will delay the healing process. If you smoke, we recommend trying to quit or at minimum reduce how much you smoke following a procedure.
Consent: - Verbal and written informed consent was obtained from the patient prior to the procedure today. \\n- The risks, benefits and alternatives to therapy were discussed in detail. \\n- Specifically, the risks of infection, scarring, bleeding, prolonged wound healing, nerve injury, incomplete removal, allergy to anesthesia and recurrence were addressed.  \\n- Prior to the procedure, the treatment site was clearly identified and confirmed by the patient. All components of Universal Protocol/PAUSE Rule completed.
Number Of Curettages: 4
Concentration (Mg/Ml Or Millions Of Plaque Forming Units/Cc): 0.01
Biopsy Photograph Reviewed: Yes
Cautery Type: electrodesiccation
Size Of Lesion After Curettage: 1.5
What Was Performed First?: Curettage
Anesthesia Type: 2% lidocaine with epinephrine
Final Size Statement: The size of the lesion after treatment was

## 2022-04-18 ENCOUNTER — APPOINTMENT (OUTPATIENT)
Dept: URBAN - METROPOLITAN AREA CLINIC 259 | Age: 82
Setting detail: DERMATOLOGY
End: 2022-04-18

## 2022-04-18 DIAGNOSIS — L90.5 SCAR CONDITIONS AND FIBROSIS OF SKIN: ICD-10-CM

## 2022-04-18 DIAGNOSIS — L57.8 OTHER SKIN CHANGES DUE TO CHRONIC EXPOSURE TO NONIONIZING RADIATION: ICD-10-CM

## 2022-04-18 PROBLEM — C44.42 SQUAMOUS CELL CARCINOMA OF SKIN OF SCALP AND NECK: Status: ACTIVE | Noted: 2022-04-18

## 2022-04-18 PROCEDURE — 17312 MOHS ADDL STAGE: CPT

## 2022-04-18 PROCEDURE — OTHER COUNSELING: OTHER

## 2022-04-18 PROCEDURE — 99213 OFFICE O/P EST LOW 20 MIN: CPT | Mod: 25

## 2022-04-18 PROCEDURE — 13121 CMPLX RPR S/A/L 2.6-7.5 CM: CPT

## 2022-04-18 PROCEDURE — OTHER PRESCRIPTION: OTHER

## 2022-04-18 PROCEDURE — 17311 MOHS 1 STAGE H/N/HF/G: CPT

## 2022-04-18 PROCEDURE — OTHER MIPS QUALITY: OTHER

## 2022-04-18 PROCEDURE — OTHER MOHS BY LAYER: OTHER

## 2022-04-18 RX ORDER — CEPHALEXIN 500 MG/1
CAPSULE ORAL
Qty: 14 | Refills: 0 | Status: ERX | COMMUNITY
Start: 2022-04-18

## 2022-04-18 ASSESSMENT — LOCATION ZONE DERM
LOCATION ZONE: SCALP
LOCATION ZONE: TRUNK

## 2022-04-18 ASSESSMENT — LOCATION DETAILED DESCRIPTION DERM
LOCATION DETAILED: RIGHT MEDIAL UPPER BACK
LOCATION DETAILED: RIGHT CENTRAL PARIETAL SCALP

## 2022-04-18 ASSESSMENT — LOCATION SIMPLE DESCRIPTION DERM
LOCATION SIMPLE: RIGHT UPPER BACK
LOCATION SIMPLE: SCALP

## 2022-04-18 NOTE — PROCEDURE: MOHS BY LAYER
Undermining Type: Entire Wound
Complex Requirements: Retention Sutures?: No
Post-Care Instructions: I reviewed with the patient in detail post-care instructions. Patient is not to engage in any heavy lifting, exercise, or swimming for the next 14 days. Should the patient develop any fevers, chills, bleeding, severe pain patient will contact the office immediately.
Detail Level: Detailed
Hemostasis: Electrocautery
Stage 8: Number Of Sections (Blocks) ?: 0
Repair Type: Complex Repair
Size Of Lesion: 1
Anesthesia Type: 1% lidocaine with epinephrine
Debridement Text: The wound edges were debrided prior to proceeding with the closure to facilitate wound healing.
Helical Rim Text: The closure involved the helical rim.
Stage 2: Defect X-Dimension: 1.8
Stage 1: Depth Of Layer: adipose tissue
Epidermal Closure: simple interrupted
Estimated Blood Loss (Cc): minimal
Number Of Stages: 2
Simple / Intermediate / Complex Repair - Final Wound Length In Cm: 3
Body Location Override (Optional - Billing Will Still Be Based On Selected Body Map Location If Applicable): Left Central Frontal Scalp
Complex Requirements: Debridement Of Wound Edges?: Yes
Width Of Defect Perpendicular To Closure In Cm (Required): 1.3
Vermilion Border Text: The closure involved the vermilion border.
Wound Care: Petrolatum
Suture Removal: 21 days
Epidermal Sutures: 3-0 Nylon
Nostril Rim Text: The closure involved the nostril rim.
Retention Suture Text: Retention sutures were placed to support the closure and prevent dehiscence.
Consent: The rationale for Mohs was explained to the patient and consent was obtained. The risks, benefits and alternatives to therapy were discussed in detail. Specifically, the risks of infection, scarring, bleeding, prolonged wound healing, incomplete removal, allergy to anesthesia, nerve injury and recurrence were addressed. Prior to the procedure, the treatment site was clearly identified and confirmed by the patient. All components of Universal Protocol/PAUSE Rule completed.

## 2022-04-26 ENCOUNTER — APPOINTMENT (OUTPATIENT)
Dept: URBAN - METROPOLITAN AREA CLINIC 254 | Age: 82
Setting detail: DERMATOLOGY
End: 2022-04-27

## 2022-04-26 VITALS — HEIGHT: 68 IN | WEIGHT: 130 LBS

## 2022-04-26 DIAGNOSIS — Z48.817 ENCOUNTER FOR SURGICAL AFTERCARE FOLLOWING SURGERY ON THE SKIN AND SUBCUTANEOUS TISSUE: ICD-10-CM

## 2022-04-26 DIAGNOSIS — L57.0 ACTINIC KERATOSIS: ICD-10-CM

## 2022-04-26 PROCEDURE — OTHER COUNSELING: OTHER

## 2022-04-26 PROCEDURE — 99213 OFFICE O/P EST LOW 20 MIN: CPT | Mod: 24

## 2022-04-26 PROCEDURE — OTHER MIPS QUALITY: OTHER

## 2022-04-26 ASSESSMENT — LOCATION SIMPLE DESCRIPTION DERM
LOCATION SIMPLE: SCALP
LOCATION SIMPLE: RIGHT SCALP
LOCATION SIMPLE: LEFT SCALP

## 2022-04-26 ASSESSMENT — LOCATION DETAILED DESCRIPTION DERM
LOCATION DETAILED: LEFT SUPERIOR PARIETAL SCALP
LOCATION DETAILED: LEFT MEDIAL FRONTAL SCALP
LOCATION DETAILED: RIGHT CENTRAL FRONTAL SCALP
LOCATION DETAILED: RIGHT SUPERIOR PARIETAL SCALP

## 2022-04-26 ASSESSMENT — LOCATION ZONE DERM: LOCATION ZONE: SCALP

## 2022-04-26 NOTE — PROCEDURE: COUNSELING
Patient Specific Counseling (Will Not Stick From Patient To Patient): -\\n- no concerns with suture site today
Patient Specific Counseling (Will Not Stick From Patient To Patient): -\\n- advised patient to use previously prescribed 5FU on thick spots on back and top of scalp - but not over suture site\\n- advised patient he can wait until his MOHS sutures come out to start using 5FU
Detail Level: Detailed

## 2022-05-09 ENCOUNTER — APPOINTMENT (OUTPATIENT)
Dept: URBAN - METROPOLITAN AREA CLINIC 257 | Age: 82
Setting detail: DERMATOLOGY
End: 2022-05-09

## 2022-05-09 DIAGNOSIS — Z48.02 ENCOUNTER FOR REMOVAL OF SUTURES: ICD-10-CM

## 2022-05-09 PROCEDURE — 99024 POSTOP FOLLOW-UP VISIT: CPT

## 2022-05-09 PROCEDURE — OTHER SUTURE REMOVAL (GLOBAL PERIOD): OTHER

## 2022-05-09 ASSESSMENT — LOCATION SIMPLE DESCRIPTION DERM: LOCATION SIMPLE: LEFT FOREHEAD

## 2022-05-09 ASSESSMENT — LOCATION DETAILED DESCRIPTION DERM: LOCATION DETAILED: LEFT SUPERIOR MEDIAL FOREHEAD

## 2022-05-09 ASSESSMENT — LOCATION ZONE DERM: LOCATION ZONE: FACE

## 2022-05-09 NOTE — PROCEDURE: SUTURE REMOVAL (GLOBAL PERIOD)
Detail Level: Detailed
Add 17864 Cpt? (Important Note: In 2017 The Use Of 25797 Is Being Tracked By Cms To Determine Future Global Period Reimbursement For Global Periods): yes

## 2022-06-30 ENCOUNTER — APPOINTMENT (OUTPATIENT)
Dept: URBAN - METROPOLITAN AREA CLINIC 254 | Age: 82
Setting detail: DERMATOLOGY
End: 2022-07-06

## 2022-06-30 VITALS — WEIGHT: 130 LBS | HEIGHT: 68 IN

## 2022-06-30 DIAGNOSIS — Z71.89 OTHER SPECIFIED COUNSELING: ICD-10-CM

## 2022-06-30 DIAGNOSIS — L57.0 ACTINIC KERATOSIS: ICD-10-CM

## 2022-06-30 PROCEDURE — 17000 DESTRUCT PREMALG LESION: CPT

## 2022-06-30 PROCEDURE — OTHER LIQUID NITROGEN: OTHER

## 2022-06-30 PROCEDURE — OTHER COUNSELING: OTHER

## 2022-06-30 PROCEDURE — 99212 OFFICE O/P EST SF 10 MIN: CPT | Mod: 25

## 2022-06-30 PROCEDURE — OTHER MIPS QUALITY: OTHER

## 2022-06-30 PROCEDURE — 17003 DESTRUCT PREMALG LES 2-14: CPT

## 2022-06-30 ASSESSMENT — LOCATION DETAILED DESCRIPTION DERM
LOCATION DETAILED: LEFT SUPERIOR LATERAL NECK
LOCATION DETAILED: POSTERIOR MID-PARIETAL SCALP
LOCATION DETAILED: LEFT SUPERIOR PARIETAL SCALP
LOCATION DETAILED: LEFT DISTAL DORSAL FOREARM
LOCATION DETAILED: LEFT MEDIAL FRONTAL SCALP
LOCATION DETAILED: RIGHT CENTRAL FRONTAL SCALP
LOCATION DETAILED: LEFT ULNAR DORSAL HAND

## 2022-06-30 ASSESSMENT — LOCATION SIMPLE DESCRIPTION DERM
LOCATION SIMPLE: LEFT HAND
LOCATION SIMPLE: SCALP
LOCATION SIMPLE: LEFT SCALP
LOCATION SIMPLE: POSTERIOR SCALP
LOCATION SIMPLE: RIGHT SCALP
LOCATION SIMPLE: NECK
LOCATION SIMPLE: LEFT FOREARM

## 2022-06-30 ASSESSMENT — LOCATION ZONE DERM
LOCATION ZONE: SCALP
LOCATION ZONE: NECK
LOCATION ZONE: HAND
LOCATION ZONE: ARM

## 2022-06-30 NOTE — PROCEDURE: MIPS QUALITY
Quality 130: Documentation Of Current Medications In The Medical Record: Current Medications Documented
Quality 111:Pneumonia Vaccination Status For Older Adults: Pneumococcal vaccine administered on or after patient’s 60th birthday and before the end of the measurement period
Quality 431: Preventive Care And Screening: Unhealthy Alcohol Use - Screening: Patient not identified as an unhealthy alcohol user when screened for unhealthy alcohol use using a systematic screening method
Quality 110: Preventive Care And Screening: Influenza Immunization: Influenza Immunization previously received during influenza season
Quality 402: Tobacco Use And Help With Quitting Among Adolescents: Patient screened for tobacco and never smoked
Detail Level: Detailed

## 2022-06-30 NOTE — PROCEDURE: LIQUID NITROGEN
Number Of Freeze-Thaw Cycles: 4 freeze-thaw cycles
Show Aperture Variable?: Yes
Post-Care Instructions: I reviewed with the patient in detail post-care instructions. Patient is to wear sunprotection, and avoid picking at any of the treated lesions. Pt may apply Vaseline to crusted or scabbing areas.
Duration Of Freeze Thaw-Cycle (Seconds): 4
Render Post-Care Instructions In Note?: no
Detail Level: Detailed
actual
Consent: The patient's consent was obtained including but not limited to risks of crusting, scabbing, blistering, scarring, darker or lighter pigmentary change, recurrence, incomplete removal and infection.

## 2022-10-04 ENCOUNTER — APPOINTMENT (OUTPATIENT)
Dept: URBAN - METROPOLITAN AREA CLINIC 254 | Age: 82
Setting detail: DERMATOLOGY
End: 2022-10-09

## 2022-10-04 VITALS — WEIGHT: 135 LBS | HEIGHT: 68 IN

## 2022-10-04 DIAGNOSIS — L82.1 OTHER SEBORRHEIC KERATOSIS: ICD-10-CM

## 2022-10-04 DIAGNOSIS — L82.0 INFLAMED SEBORRHEIC KERATOSIS: ICD-10-CM

## 2022-10-04 DIAGNOSIS — Z71.89 OTHER SPECIFIED COUNSELING: ICD-10-CM

## 2022-10-04 DIAGNOSIS — L57.0 ACTINIC KERATOSIS: ICD-10-CM

## 2022-10-04 PROCEDURE — OTHER COUNSELING: OTHER

## 2022-10-04 PROCEDURE — 17000 DESTRUCT PREMALG LESION: CPT | Mod: 59

## 2022-10-04 PROCEDURE — 17003 DESTRUCT PREMALG LES 2-14: CPT | Mod: 59

## 2022-10-04 PROCEDURE — OTHER LIQUID NITROGEN: OTHER

## 2022-10-04 PROCEDURE — 17110 DESTRUCT B9 LESION 1-14: CPT

## 2022-10-04 PROCEDURE — OTHER MIPS QUALITY: OTHER

## 2022-10-04 PROCEDURE — 99212 OFFICE O/P EST SF 10 MIN: CPT | Mod: 25

## 2022-10-04 ASSESSMENT — LOCATION ZONE DERM
LOCATION ZONE: HAND
LOCATION ZONE: ARM
LOCATION ZONE: SCALP
LOCATION ZONE: NECK

## 2022-10-04 ASSESSMENT — LOCATION DETAILED DESCRIPTION DERM
LOCATION DETAILED: LEFT SUPERIOR PARIETAL SCALP
LOCATION DETAILED: LEFT ULNAR DORSAL HAND
LOCATION DETAILED: RIGHT PROXIMAL POSTERIOR UPPER ARM
LOCATION DETAILED: LEFT MEDIAL FRONTAL SCALP
LOCATION DETAILED: LEFT DISTAL DORSAL FOREARM
LOCATION DETAILED: LEFT SUPERIOR LATERAL NECK
LOCATION DETAILED: POSTERIOR MID-PARIETAL SCALP

## 2022-10-04 ASSESSMENT — LOCATION SIMPLE DESCRIPTION DERM
LOCATION SIMPLE: RIGHT UPPER ARM
LOCATION SIMPLE: LEFT FOREARM
LOCATION SIMPLE: POSTERIOR SCALP
LOCATION SIMPLE: NECK
LOCATION SIMPLE: SCALP
LOCATION SIMPLE: LEFT SCALP
LOCATION SIMPLE: LEFT HAND

## 2022-10-04 NOTE — PROCEDURE: LIQUID NITROGEN
Include Z78.9 (Other Specified Conditions Influencing Health Status) As An Associated Diagnosis?: No
Consent: The patient's consent was obtained including but not limited to risks of crusting, scabbing, blistering, scarring, darker or lighter pigmentary change, recurrence, incomplete removal and infection.
Show Applicator Variable?: Yes
Number Of Freeze-Thaw Cycles: 4 freeze-thaw cycles
Post-Care Instructions: I reviewed with the patient in detail post-care instructions. Patient is to wear sunprotection, and avoid picking at any of the treated lesions. Pt may apply Vaseline to crusted or scabbing areas.
Medical Necessity Clause: This procedure was medically necessary because the lesions that were treated were:
Detail Level: Detailed
Duration Of Freeze Thaw-Cycle (Seconds): 4
Spray Paint Text: The liquid nitrogen was applied to the skin utilizing a spray paint frosting technique.
Medical Necessity Information: It is in your best interest to select a reason for this procedure from the list below. All of these items fulfill various CMS LCD requirements except the new and changing color options.

## 2022-10-04 NOTE — PROCEDURE: COUNSELING
Detail Level: Detailed
Skin Checks Recommendations: Rec FBE
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

## 2022-10-04 NOTE — PROCEDURE: MIPS QUALITY
Quality 402: Tobacco Use And Help With Quitting Among Adolescents: Patient screened for tobacco and never smoked
Quality 110: Preventive Care And Screening: Influenza Immunization: Influenza Immunization previously received during influenza season
Quality 130: Documentation Of Current Medications In The Medical Record: Current Medications Documented
Quality 111:Pneumonia Vaccination Status For Older Adults: Pneumococcal vaccine (PPSV23) administered on or after patient’s 60th birthday and before the end of the measurement period
Detail Level: Detailed
Quality 431: Preventive Care And Screening: Unhealthy Alcohol Use - Screening: Patient not identified as an unhealthy alcohol user when screened for unhealthy alcohol use using a systematic screening method

## 2023-04-18 NOTE — PROCEDURE: SUNSCREEN RECOMMENDATIONS
Problem: Nausea/Vomiting:  Goal: Absence of nausea/vomiting  Description: Absence of nausea/vomiting  Outcome: Ongoing  Goal: Able to drink  Description: Able to drink  Outcome: Ongoing  Goal: Able to eat  Description: Able to eat  Outcome: Ongoing  Goal: Ability to achieve adequate nutritional intake will improve  Description: Ability to achieve adequate nutritional intake will improve  Outcome: Ongoing     Problem: Pain:  Goal: Pain level will decrease  Description: Pain level will decrease  Outcome: Ongoing  Goal: Control of acute pain  Description: Control of acute pain  Outcome: Ongoing  Goal: Control of chronic pain  Description: Control of chronic pain  Outcome: Ongoing     Problem: Falls - Risk of:  Goal: Will remain free from falls  Description: Will remain free from falls  Outcome: Ongoing  Goal: Absence of physical injury  Description: Absence of physical injury  Outcome: Ongoing     Problem: Nutrition  Goal: Optimal nutrition therapy  Outcome: Ongoing
General Sunscreen Counseling: I recommended a broad spectrum sunscreen with a SPF of 30 or higher.  I explained that SPF 30 sunscreens block approximately 97 percent of the sun's harmful rays.  Sunscreens should be applied at least 15 minutes prior to expected sun exposure and then every 2 hours after that as long as sun exposure continues. If swimming or exercising sunscreen should be reapplied every 45 minutes to an hour after getting wet or sweating.  One ounce, or the equivalent of a shot glass full of sunscreen, is adequate to protect the skin not covered by a bathing suit. I also recommended a lip balm with a sunscreen as well. Sun protective clothing can be used in lieu of sunscreen but must be worn the entire time you are exposed to the sun's rays.
Detail Level: Detailed
R shoulder flexion 2/5, R elbow flexion 3/5, R  3/5

## 2023-04-20 ENCOUNTER — APPOINTMENT (OUTPATIENT)
Dept: URBAN - METROPOLITAN AREA CLINIC 254 | Age: 83
Setting detail: DERMATOLOGY
End: 2023-04-22

## 2023-04-20 VITALS — WEIGHT: 130 LBS | RESPIRATION RATE: 14 BRPM | HEIGHT: 68 IN

## 2023-04-20 DIAGNOSIS — Z85.828 PERSONAL HISTORY OF OTHER MALIGNANT NEOPLASM OF SKIN: ICD-10-CM

## 2023-04-20 DIAGNOSIS — L57.0 ACTINIC KERATOSIS: ICD-10-CM

## 2023-04-20 PROCEDURE — 17000 DESTRUCT PREMALG LESION: CPT

## 2023-04-20 PROCEDURE — OTHER COUNSELING: OTHER

## 2023-04-20 PROCEDURE — OTHER LIQUID NITROGEN: OTHER

## 2023-04-20 PROCEDURE — 17003 DESTRUCT PREMALG LES 2-14: CPT

## 2023-04-20 PROCEDURE — 99213 OFFICE O/P EST LOW 20 MIN: CPT | Mod: 25

## 2023-04-20 PROCEDURE — OTHER MIPS QUALITY: OTHER

## 2023-04-20 ASSESSMENT — LOCATION SIMPLE DESCRIPTION DERM
LOCATION SIMPLE: LEFT FOREARM
LOCATION SIMPLE: SCALP
LOCATION SIMPLE: RIGHT FOREHEAD
LOCATION SIMPLE: LEFT HAND
LOCATION SIMPLE: RIGHT SCALP
LOCATION SIMPLE: LEFT FOREHEAD
LOCATION SIMPLE: LEFT EAR

## 2023-04-20 ASSESSMENT — LOCATION ZONE DERM
LOCATION ZONE: EAR
LOCATION ZONE: HAND
LOCATION ZONE: FACE
LOCATION ZONE: ARM
LOCATION ZONE: SCALP

## 2023-04-20 ASSESSMENT — LOCATION DETAILED DESCRIPTION DERM
LOCATION DETAILED: LEFT DISTAL DORSAL FOREARM
LOCATION DETAILED: RIGHT SUPERIOR MEDIAL FOREHEAD
LOCATION DETAILED: LEFT SUPERIOR MEDIAL FOREHEAD
LOCATION DETAILED: LEFT SUPERIOR PARIETAL SCALP
LOCATION DETAILED: RIGHT CENTRAL FRONTAL SCALP
LOCATION DETAILED: LEFT ULNAR DORSAL HAND
LOCATION DETAILED: LEFT CENTRAL PARIETAL SCALP
LOCATION DETAILED: LEFT SUPERIOR HELIX
LOCATION DETAILED: LEFT PROXIMAL DORSAL FOREARM

## 2023-04-20 NOTE — PROCEDURE: MIPS QUALITY
Detail Level: Detailed
Quality 431: Preventive Care And Screening: Unhealthy Alcohol Use - Screening: Patient not identified as an unhealthy alcohol user when screened for unhealthy alcohol use using a systematic screening method
Quality 402: Tobacco Use And Help With Quitting Among Adolescents: Patient screened for tobacco and never smoked
Quality 110: Preventive Care And Screening: Influenza Immunization: Influenza Immunization previously received during influenza season
Quality 130: Documentation Of Current Medications In The Medical Record: Current Medications Documented
Quality 111:Pneumonia Vaccination Status For Older Adults: Pneumococcal vaccine (PPSV23) administered on or after patient’s 60th birthday and before the end of the measurement period

## 2023-04-20 NOTE — PROCEDURE: COUNSELING
Detail Level: Detailed
Patient Specific Counseling (Will Not Stick From Patient To Patient): Advised patient to initiate Fluorouracil topical twice daily on affected areas on top of head/scalp, provided patient with diagram, for up to 3 weeks (as tolerated) to help treat any remaining actinic keratoses.

## 2023-04-20 NOTE — PROCEDURE: LIQUID NITROGEN
Show Applicator Variable?: Yes
Post-Care Instructions: I reviewed with the patient in detail post-care instructions. Patient is to wear sunprotection, and avoid picking at any of the treated lesions. Pt may apply Vaseline to crusted or scabbing areas.
Duration Of Freeze Thaw-Cycle (Seconds): 3
Number Of Freeze-Thaw Cycles: 3 freeze-thaw cycles
Consent: The patient's consent was obtained including but not limited to risks of crusting, scabbing, blistering, scarring, darker or lighter pigmentary change, recurrence, incomplete removal and infection.
Render Note In Bullet Format When Appropriate: No
Detail Level: Detailed

## 2023-07-25 ENCOUNTER — APPOINTMENT (OUTPATIENT)
Dept: URBAN - METROPOLITAN AREA CLINIC 254 | Age: 83
Setting detail: DERMATOLOGY
End: 2023-07-28

## 2023-07-25 DIAGNOSIS — L57.0 ACTINIC KERATOSIS: ICD-10-CM

## 2023-07-25 DIAGNOSIS — L82.0 INFLAMED SEBORRHEIC KERATOSIS: ICD-10-CM

## 2023-07-25 DIAGNOSIS — D49.2 NEOPLASM OF UNSPECIFIED BEHAVIOR OF BONE, SOFT TISSUE, AND SKIN: ICD-10-CM

## 2023-07-25 PROCEDURE — OTHER ADDITIONAL NOTES: OTHER

## 2023-07-25 PROCEDURE — OTHER COUNSELING: OTHER

## 2023-07-25 PROCEDURE — OTHER LIQUID NITROGEN: OTHER

## 2023-07-25 PROCEDURE — OTHER MIPS QUALITY: OTHER

## 2023-07-25 PROCEDURE — 99213 OFFICE O/P EST LOW 20 MIN: CPT | Mod: 25

## 2023-07-25 PROCEDURE — 17000 DESTRUCT PREMALG LESION: CPT | Mod: 59

## 2023-07-25 PROCEDURE — 17110 DESTRUCT B9 LESION 1-14: CPT | Mod: 59

## 2023-07-25 PROCEDURE — 17003 DESTRUCT PREMALG LES 2-14: CPT | Mod: 59

## 2023-07-25 PROCEDURE — OTHER SHAVE REMOVAL: OTHER

## 2023-07-25 PROCEDURE — 11301 SHAVE SKIN LESION 0.6-1.0 CM: CPT

## 2023-07-25 ASSESSMENT — LOCATION ZONE DERM
LOCATION ZONE: SCALP
LOCATION ZONE: ARM
LOCATION ZONE: HAND

## 2023-07-25 ASSESSMENT — LOCATION SIMPLE DESCRIPTION DERM
LOCATION SIMPLE: RIGHT HAND
LOCATION SIMPLE: POSTERIOR SCALP
LOCATION SIMPLE: LEFT HAND
LOCATION SIMPLE: LEFT FOREARM
LOCATION SIMPLE: LEFT ELBOW
LOCATION SIMPLE: RIGHT FOREARM
LOCATION SIMPLE: RIGHT UPPER ARM

## 2023-07-25 ASSESSMENT — LOCATION DETAILED DESCRIPTION DERM
LOCATION DETAILED: RIGHT RADIAL DORSAL HAND
LOCATION DETAILED: POSTERIOR MID-PARIETAL SCALP
LOCATION DETAILED: RIGHT PROXIMAL LATERAL POSTERIOR UPPER ARM
LOCATION DETAILED: LEFT ELBOW
LOCATION DETAILED: LEFT ULNAR DORSAL HAND
LOCATION DETAILED: RIGHT DISTAL DORSAL FOREARM
LOCATION DETAILED: LEFT DISTAL DORSAL FOREARM

## 2023-07-25 NOTE — PROCEDURE: LIQUID NITROGEN
Show Aperture Variable?: Yes
Consent: The patient's consent was obtained including but not limited to risks of crusting, scabbing, blistering, scarring, darker or lighter pigmentary change, recurrence, incomplete removal and infection.
Duration Of Freeze Thaw-Cycle (Seconds): 3
Post-Care Instructions: I reviewed with the patient in detail post-care instructions. Patient is to wear sunprotection, and avoid picking at any of the treated lesions. Pt may apply Vaseline to crusted or scabbing areas.
Detail Level: Detailed
Number Of Freeze-Thaw Cycles: 3 freeze-thaw cycles
Render Note In Bullet Format When Appropriate: No
Number Of Freeze-Thaw Cycles: 2 freeze-thaw cycles
Medical Necessity Clause: This procedure was medically necessary because the lesions that were treated were:
Spray Paint Text: The liquid nitrogen was applied to the skin utilizing a spray paint frosting technique.
Medical Necessity Information: It is in your best interest to select a reason for this procedure from the list below. All of these items fulfill various CMS LCD requirements except the new and changing color options.

## 2023-07-25 NOTE — PROCEDURE: ADDITIONAL NOTES
Additional Notes: Recommended pt use Efudex once to twice weekly. Can call for refills if needed
Render Risk Assessment In Note?: no
Detail Level: Simple

## 2023-07-25 NOTE — PROCEDURE: SHAVE REMOVAL
delivery delivered Notification Instructions: Patient will be notified of pathology results. However, patient instructed to call the office if not contacted within 2 weeks.

## 2023-08-03 NOTE — PROCEDURE: LIQUID NITROGEN
no edema
Render Post-Care Instructions In Note?: yes
Detail Level: Detailed
Post-Care Instructions: Patient was instructed to avoid picking at any of the treated lesions. Should any lesions treated today not be resolved within 8 weeks or if not certain, then return to clinic for re-evaluation.
Render Note In Bullet Format When Appropriate: No
Duration Of Freeze Thaw-Cycle (Seconds): 0
Consent: Discussed that these are a result of cumulative sun exposure. Verbal and written consent was obtained, and risks were reviewed prior to procedure today. Risks discussed include but are not limited to pain, crusting, scabbing, blistering, scarring, temporary or permanent darker or lighter pigmentary change, recurrence, incomplete resolution, and infection.

## 2023-10-31 ENCOUNTER — APPOINTMENT (OUTPATIENT)
Dept: URBAN - METROPOLITAN AREA CLINIC 254 | Age: 83
Setting detail: DERMATOLOGY
End: 2023-10-31

## 2023-10-31 VITALS — HEIGHT: 68 IN | WEIGHT: 125 LBS

## 2023-10-31 DIAGNOSIS — L82.0 INFLAMED SEBORRHEIC KERATOSIS: ICD-10-CM

## 2023-10-31 DIAGNOSIS — D49.2 NEOPLASM OF UNSPECIFIED BEHAVIOR OF BONE, SOFT TISSUE, AND SKIN: ICD-10-CM

## 2023-10-31 DIAGNOSIS — L57.0 ACTINIC KERATOSIS: ICD-10-CM

## 2023-10-31 DIAGNOSIS — L57.8 OTHER SKIN CHANGES DUE TO CHRONIC EXPOSURE TO NONIONIZING RADIATION: ICD-10-CM

## 2023-10-31 PROCEDURE — 11102 TANGNTL BX SKIN SINGLE LES: CPT | Mod: 59

## 2023-10-31 PROCEDURE — OTHER ADDITIONAL NOTES: OTHER

## 2023-10-31 PROCEDURE — OTHER MIPS QUALITY: OTHER

## 2023-10-31 PROCEDURE — 11310 SHAVE SKIN LESION 0.5 CM/<: CPT | Mod: 76

## 2023-10-31 PROCEDURE — 17110 DESTRUCT B9 LESION 1-14: CPT | Mod: 59

## 2023-10-31 PROCEDURE — 17003 DESTRUCT PREMALG LES 2-14: CPT | Mod: 59

## 2023-10-31 PROCEDURE — OTHER LIQUID NITROGEN: OTHER

## 2023-10-31 PROCEDURE — OTHER SHAVE REMOVAL (NO PATHOLOGY): OTHER

## 2023-10-31 PROCEDURE — OTHER BIOPSY BY SHAVE METHOD: OTHER

## 2023-10-31 PROCEDURE — OTHER COUNSELING: OTHER

## 2023-10-31 PROCEDURE — 17000 DESTRUCT PREMALG LESION: CPT | Mod: 59

## 2023-10-31 PROCEDURE — 11310 SHAVE SKIN LESION 0.5 CM/<: CPT

## 2023-10-31 PROCEDURE — 99213 OFFICE O/P EST LOW 20 MIN: CPT | Mod: 25

## 2023-10-31 ASSESSMENT — LOCATION DETAILED DESCRIPTION DERM
LOCATION DETAILED: LEFT DISTAL DORSAL FOREARM
LOCATION DETAILED: LEFT MEDIAL FRONTAL SCALP
LOCATION DETAILED: LEFT SUPERIOR CENTRAL MALAR CHEEK
LOCATION DETAILED: RIGHT SUPERIOR PARIETAL SCALP
LOCATION DETAILED: LEFT FOREHEAD
LOCATION DETAILED: LEFT SUPERIOR PARIETAL SCALP
LOCATION DETAILED: LEFT LATERAL FOREHEAD
LOCATION DETAILED: LEFT CENTRAL PARIETAL SCALP
LOCATION DETAILED: LEFT SUPERIOR LATERAL MALAR CHEEK
LOCATION DETAILED: SUPERIOR MID FOREHEAD
LOCATION DETAILED: LEFT SUPERIOR HELIX
LOCATION DETAILED: LEFT ULNAR DORSAL HAND
LOCATION DETAILED: RIGHT SUPERIOR FOREHEAD

## 2023-10-31 ASSESSMENT — LOCATION SIMPLE DESCRIPTION DERM
LOCATION SIMPLE: LEFT EAR
LOCATION SIMPLE: LEFT FOREARM
LOCATION SIMPLE: SUPERIOR FOREHEAD
LOCATION SIMPLE: LEFT FOREHEAD
LOCATION SIMPLE: SCALP
LOCATION SIMPLE: RIGHT FOREHEAD
LOCATION SIMPLE: LEFT CHEEK
LOCATION SIMPLE: LEFT HAND
LOCATION SIMPLE: LEFT SCALP

## 2023-10-31 ASSESSMENT — LOCATION ZONE DERM
LOCATION ZONE: SCALP
LOCATION ZONE: HAND
LOCATION ZONE: ARM
LOCATION ZONE: EAR
LOCATION ZONE: FACE

## 2023-10-31 NOTE — PROCEDURE: LIQUID NITROGEN
Show Applicator Variable?: Yes
Consent: - Discussed that these are a result of cumulative sun exposure.\\n- Consent was obtained and risks were reviewed prior to procedure today. All questions were answered prior to procedure today.\\n- Risks discussed include but are not limited to pain, crusting, scabbing, blistering, scarring, temporary or permanent darker or lighter pigmentary change, recurrence, incomplete resolution, and infection.
Post-Care Instructions: - Patient was instructed to avoid picking at any of the treated lesions.
Application Tool (Optional): Liquid Nitrogen Sprayer
Detail Level: Detailed
Duration Of Freeze Thaw-Cycle (Seconds): 0
Show Aperture Variable?: No
Medical Necessity Information: It is in your best interest to select a reason for this procedure from the list below. All of these items fulfill various CMS LCD requirements except the new and changing color options.
Consent: - Consent was obtained and risks were reviewed prior to procedure today. All questions were answered prior to procedure today.\\n- Risks discussed include but are not limited to pain, crusting, scabbing, blistering, scarring, temporary or permanent darker or lighter pigmentary change, recurrence, incomplete resolution, and infection.
Medical Necessity Clause: This procedure was medically necessary because the lesions that were treated were:
Post-Care Instructions: - Avoid picking at any of the treated lesions.\\n- Blisters should not be popped. However should a blister rupture, cover it with Vaseline ointment or Aquaphor and a bandage until healed.
Spray Paint Text: The liquid nitrogen was applied to the skin utilizing a spray paint frosting technique.

## 2023-10-31 NOTE — PROCEDURE: SHAVE REMOVAL (NO PATHOLOGY)
Medical Necessity Information: It is in your best interest to select a reason for this procedure from the list below. All of these items fulfill various CMS LCD requirements except the new and changing color options.
Include Z78.9 (Other Specified Conditions Influencing Health Status) As An Associated Diagnosis?: No
Medical Necessity Clause: This procedure was medically necessary because the lesion that was treated was:
Detail Level: Detailed
Size Of Lesion In Cm: 0.5
X Size Of Lesion In Cm (Optional): 0
Anesthesia Type: 1% lidocaine with epinephrine and a 1:10 solution of 8.4% sodium bicarbonate
Hemostasis: Drysol
Wound Care: Petrolatum
Consent was obtained from the patient. The risks and benefits to therapy were discussed in detail. Specifically, the risks of infection, scarring, bleeding, prolonged wound healing, incomplete removal, allergy to anesthesia, nerve injury and recurrence were addressed. Prior to the procedure, the treatment site was clearly identified and confirmed by the patient. All components of Universal Protocol/PAUSE Rule completed.
Post-Care Instructions: I reviewed with the patient in detail post-care instructions. Patient is to keep the biopsy site dry overnight, and then apply bacitracin twice daily until healed. Patient may apply hydrogen peroxide soaks to remove any crusting.

## 2023-10-31 NOTE — PROCEDURE: ADDITIONAL NOTES
Additional Notes: - Recommended applying 5FU to affected areas on the scalp once weekly.
Render Risk Assessment In Note?: no
Detail Level: Simple

## 2024-02-15 ENCOUNTER — APPOINTMENT (OUTPATIENT)
Dept: URBAN - METROPOLITAN AREA CLINIC 254 | Age: 84
Setting detail: DERMATOLOGY
End: 2024-02-15

## 2024-02-15 DIAGNOSIS — L57.8 OTHER SKIN CHANGES DUE TO CHRONIC EXPOSURE TO NONIONIZING RADIATION: ICD-10-CM

## 2024-02-15 DIAGNOSIS — L82.1 OTHER SEBORRHEIC KERATOSIS: ICD-10-CM

## 2024-02-15 DIAGNOSIS — L57.0 ACTINIC KERATOSIS: ICD-10-CM

## 2024-02-15 DIAGNOSIS — D49.2 NEOPLASM OF UNSPECIFIED BEHAVIOR OF BONE, SOFT TISSUE, AND SKIN: ICD-10-CM

## 2024-02-15 PROCEDURE — 99213 OFFICE O/P EST LOW 20 MIN: CPT | Mod: 25

## 2024-02-15 PROCEDURE — OTHER BIOPSY BY SHAVE METHOD: OTHER

## 2024-02-15 PROCEDURE — OTHER ADDITIONAL NOTES: OTHER

## 2024-02-15 PROCEDURE — OTHER COUNSELING: OTHER

## 2024-02-15 PROCEDURE — 17000 DESTRUCT PREMALG LESION: CPT | Mod: 59

## 2024-02-15 PROCEDURE — OTHER MIPS QUALITY: OTHER

## 2024-02-15 PROCEDURE — 11102 TANGNTL BX SKIN SINGLE LES: CPT

## 2024-02-15 PROCEDURE — 17003 DESTRUCT PREMALG LES 2-14: CPT

## 2024-02-15 PROCEDURE — OTHER LIQUID NITROGEN: OTHER

## 2024-02-15 ASSESSMENT — LOCATION DETAILED DESCRIPTION DERM
LOCATION DETAILED: RIGHT SUPERIOR PARIETAL SCALP
LOCATION DETAILED: RIGHT MEDIAL FRONTAL SCALP
LOCATION DETAILED: RIGHT SUPERIOR MEDIAL FOREHEAD
LOCATION DETAILED: LEFT PROXIMAL DORSAL FOREARM
LOCATION DETAILED: LEFT SUPERIOR OCCIPITAL SCALP
LOCATION DETAILED: LEFT CENTRAL PARIETAL SCALP
LOCATION DETAILED: LEFT MEDIAL FRONTAL SCALP
LOCATION DETAILED: RIGHT INFERIOR POSTERIOR HELIX
LOCATION DETAILED: RIGHT INFERIOR OCCIPITAL SCALP
LOCATION DETAILED: LEFT RADIAL DORSAL HAND
LOCATION DETAILED: LEFT SUPERIOR HELIX
LOCATION DETAILED: SUPERIOR MID FOREHEAD
LOCATION DETAILED: LEFT SUPERIOR PARIETAL SCALP
LOCATION DETAILED: LEFT DISTAL DORSAL FOREARM

## 2024-02-15 ASSESSMENT — LOCATION ZONE DERM
LOCATION ZONE: HAND
LOCATION ZONE: EAR
LOCATION ZONE: SCALP
LOCATION ZONE: FACE
LOCATION ZONE: ARM

## 2024-02-15 ASSESSMENT — LOCATION SIMPLE DESCRIPTION DERM
LOCATION SIMPLE: LEFT OCCIPITAL SCALP
LOCATION SIMPLE: RIGHT SCALP
LOCATION SIMPLE: LEFT SCALP
LOCATION SIMPLE: RIGHT EAR
LOCATION SIMPLE: LEFT FOREARM
LOCATION SIMPLE: LEFT HAND
LOCATION SIMPLE: SCALP
LOCATION SIMPLE: POSTERIOR SCALP
LOCATION SIMPLE: SUPERIOR FOREHEAD
LOCATION SIMPLE: LEFT EAR
LOCATION SIMPLE: RIGHT FOREHEAD

## 2024-02-15 NOTE — PROCEDURE: MIPS QUALITY
Quality 47: Advance Care Plan: Advance Care Planning discussed and documented; advance care plan or surrogate decision maker documented in the medical record.
Quality 226: Preventive Care And Screening: Tobacco Use: Screening And Cessation Intervention: Patient screened for tobacco use and is an ex/non-smoker
Quality 137: Melanoma: Continuity Of Care - Recall System: Recall system not utilized, reason not otherwise specified
Detail Level: Detailed

## 2024-02-15 NOTE — PROCEDURE: ADDITIONAL NOTES
Additional Notes: - Recommended applying 5FU to affected areas on the scalp once daily for 7 days per month. Pt can repeat if tolerating well. Reassured pt can still shower and wash his hair.
Render Risk Assessment In Note?: no
Detail Level: Simple

## 2024-02-15 NOTE — PROCEDURE: BIOPSY BY SHAVE METHOD
Additional Anesthesia Volume In Cc (Will Not Render If 0): 0
Bill 12400 For Specimen Handling/Conveyance To Laboratory?: no
Hide Additional Size Dimension?: Yes
Lab: [id number]
Depth Of Biopsy: dermis
Silver Nitrate Text: The wound bed was treated with silver nitrate after the biopsy was performed.
Biopsy Type: H and E
Post-Care Instructions: WOUND CARE:\\nDo NOT submerge wound in a bath, swimming pool, or hot tub for at least 1 week or for as long as there is an open wound. Gently cleanse the site daily with mild soap and water. Be careful NOT to allow a forceful stream of water to hit the biopsy site. After cleaning/showering, apply a thin layer of petrolatum ointment or Aquaphor in the wound followed by an adhesive bandage. Continue this process daily until healed. \\n\\nBLEEDING:\\nIf you develop persistent bleeding, apply firm and steady pressure over the dressing with gauze for 15 minutes. If bleeding persists, reapply pressure with an ice pack over the gauze for 15 minutes. NEVER APPLY ICE DIRECTLY TO THE SKIN. Do NOT peek under the gauze during these 15 minutes of pressure.  Call our office at [phone] if you cannot get the bleeding to stop. \\n\\nINFECTION:\\nSigns of infection may include increasing rather than decreasing pain (after the first few days), increasing redness/swelling/heat, draining pus, pink/red streaks around the wound, and fever or chills.  Please call our office immediately at [phone] if infection is suspected. It is normal to have some mild redness on or around the wound edges; this will lighten day by day and will become less tender.\\n\\nPAIN:\\nPain is usually minimal, but if needed you may take acetaminophen (Tylenol) every four hours as needed. Applying an ice pack over the dressing for 15-20 minutes every 2-3 hours will relieve swelling, lessen pain, and help minimize bruising. NEVER APPLY ICE DIRECTLY TO THE SKIN. Avoid ibuprofen (Advil, Motrin) and naproxen (Aleve) for the first 48 hours as these can increase bleeding.\\n\\nSWELLING AND BRUISING:\\nSwelling and bruising are common and temporary, usually lasting 1 - 2 weeks. It is more common in areas treated around the eyes, hands, and feet. In the days following the procedure, swelling and bruising can be minimized by keeping the affected areas elevated when possible, reducing salty foods, and applying ice packs over the dressing for 15-20 minutes every 2-3 hours. NEVER APPLY ICE DIRECTLY TO THE SKIN.\\n\\nITCHING:\\nItchiness on and around the wound is very common and can last several days to weeks after surgery. Mild itch is normal as the wound is healing. \\n\\nNERVE CHANGES:\\nNumbness is usually temporary, but it may last for several weeks to months. You may also experience sharp pains at the wound sight as it heals.  Mild pain is normal and will gradually improve with time.\\n \\nNO SMOKING:\\nSmoking will delay the healing process. If you smoke, we recommend trying to quit or at minimum reduce how much you smoke following a procedure.
Billing Type: Third-Party Bill
Dressing: bandage
Biopsy Method: Dermablade
Curettage Text: The wound bed was treated with curettage after the biopsy was performed.
Electrodesiccation And Curettage Text: The wound bed was treated with electrodesiccation and curettage after the biopsy was performed.
Hemostasis: Drysol
Consent: - Consent was obtained and risks were reviewed prior to procedure today. All questions were answered prior to procedure today.\\n- Risks discussed include but are not limited to scarring, infection, bleeding, scabbing, incomplete removal, nerve damage, pain, and allergy to anesthesia.
Type Of Destruction Used: Curettage
Wound Care: Aquaphor
Cryotherapy Text: The wound bed was treated with cryotherapy after the biopsy was performed.
Detail Level: Detailed
Anesthesia Volume In Cc: 0.3
Anesthesia Type: 1% lidocaine with epinephrine
Notification Instructions: - It can take up to 2 -3 weeks to get a biopsy result. \\n- Please refrain from calling to request results until after 2 weeks.
Electrodesiccation Text: The wound bed was treated with electrodesiccation after the biopsy was performed.
Information: Selecting Yes will display possible errors in your note based on the variables you have selected. This validation is only offered as a suggestion for you. PLEASE NOTE THAT THE VALIDATION TEXT WILL BE REMOVED WHEN YOU FINALIZE YOUR NOTE. IF YOU WANT TO FAX A PRELIMINARY NOTE YOU WILL NEED TO TOGGLE THIS TO 'NO' IF YOU DO NOT WANT IT IN YOUR FAXED NOTE.

## 2024-02-15 NOTE — PROCEDURE: LIQUID NITROGEN
Duration Of Freeze Thaw-Cycle (Seconds): 0
Show Applicator Variable?: Yes
Show Aperture Variable?: No
Post-Care Instructions: - Patient was instructed to avoid picking at any of the treated lesions.
Consent: - Discussed that these are a result of cumulative sun exposure.\\n- Consent was obtained and risks were reviewed prior to procedure today. All questions were answered prior to procedure today.\\n- Risks discussed include but are not limited to pain, crusting, scabbing, blistering, scarring, temporary or permanent darker or lighter pigmentary change, recurrence, incomplete resolution, and infection.
Application Tool (Optional): Liquid Nitrogen Sprayer
Detail Level: Detailed

## 2024-02-15 NOTE — PROCEDURE: COUNSELING
Detail Level: Simple
Detail Level: Detailed
Detail Level: Zone
Patient Specific Counseling (Will Not Stick From Patient To Patient): - Advised that this lesion has features that suggest a possible skin cancer so a biopsy is necessary to confirm the diagnosis and guide treatment.\\n- Patient expressed understanding.\\n- If skin cancer is confirmed, further treatment will be necessary.